# Patient Record
Sex: FEMALE | URBAN - METROPOLITAN AREA
[De-identification: names, ages, dates, MRNs, and addresses within clinical notes are randomized per-mention and may not be internally consistent; named-entity substitution may affect disease eponyms.]

---

## 2017-09-01 ENCOUNTER — OUTPATIENT (OUTPATIENT)
Dept: OUTPATIENT SERVICES | Facility: HOSPITAL | Age: 62
LOS: 1 days | End: 2017-09-01
Payer: MEDICAID

## 2017-09-06 DIAGNOSIS — R69 ILLNESS, UNSPECIFIED: ICD-10-CM

## 2017-10-01 PROCEDURE — G9001: CPT

## 2022-03-11 ENCOUNTER — INPATIENT (INPATIENT)
Facility: HOSPITAL | Age: 67
LOS: 6 days | Discharge: NURSING, REHAB OR LONG-TERM CARE | DRG: 598 | End: 2022-03-18
Attending: INTERNAL MEDICINE | Admitting: INTERNAL MEDICINE
Payer: MEDICARE

## 2022-03-11 VITALS
DIASTOLIC BLOOD PRESSURE: 65 MMHG | SYSTOLIC BLOOD PRESSURE: 93 MMHG | TEMPERATURE: 98 F | RESPIRATION RATE: 22 BRPM | WEIGHT: 139.99 LBS | HEART RATE: 104 BPM | HEIGHT: 62 IN

## 2022-03-11 DIAGNOSIS — K62.89 OTHER SPECIFIED DISEASES OF ANUS AND RECTUM: ICD-10-CM

## 2022-03-11 LAB
ALBUMIN SERPL ELPH-MCNC: 1.9 G/DL — LOW (ref 3.3–5)
ALP SERPL-CCNC: 131 U/L — HIGH (ref 30–120)
ALT FLD-CCNC: 16 U/L DA — SIGNIFICANT CHANGE UP (ref 10–60)
ANION GAP SERPL CALC-SCNC: 17 MMOL/L — SIGNIFICANT CHANGE UP (ref 5–17)
APTT BLD: 31.3 SEC — SIGNIFICANT CHANGE UP (ref 27.5–35.5)
AST SERPL-CCNC: 44 U/L — HIGH (ref 10–40)
BASOPHILS # BLD AUTO: 0.02 K/UL — SIGNIFICANT CHANGE UP (ref 0–0.2)
BASOPHILS NFR BLD AUTO: 0.1 % — SIGNIFICANT CHANGE UP (ref 0–2)
BILIRUB SERPL-MCNC: 0.4 MG/DL — SIGNIFICANT CHANGE UP (ref 0.2–1.2)
BUN SERPL-MCNC: 66 MG/DL — HIGH (ref 7–23)
CALCIUM SERPL-MCNC: 8.6 MG/DL — SIGNIFICANT CHANGE UP (ref 8.4–10.5)
CHLORIDE SERPL-SCNC: 97 MMOL/L — SIGNIFICANT CHANGE UP (ref 96–108)
CK SERPL-CCNC: 327 U/L — HIGH (ref 26–192)
CO2 SERPL-SCNC: 19 MMOL/L — LOW (ref 22–31)
CREAT SERPL-MCNC: 3.62 MG/DL — HIGH (ref 0.5–1.3)
EGFR: 13 ML/MIN/1.73M2 — LOW
EOSINOPHIL # BLD AUTO: 0 K/UL — SIGNIFICANT CHANGE UP (ref 0–0.5)
EOSINOPHIL NFR BLD AUTO: 0 % — SIGNIFICANT CHANGE UP (ref 0–6)
GLUCOSE SERPL-MCNC: 91 MG/DL — SIGNIFICANT CHANGE UP (ref 70–99)
HCT VFR BLD CALC: 38.8 % — SIGNIFICANT CHANGE UP (ref 34.5–45)
HGB BLD-MCNC: 12.5 G/DL — SIGNIFICANT CHANGE UP (ref 11.5–15.5)
IMM GRANULOCYTES NFR BLD AUTO: 0.4 % — SIGNIFICANT CHANGE UP (ref 0–1.5)
INR BLD: 1.19 RATIO — HIGH (ref 0.88–1.16)
LACTATE SERPL-SCNC: 1.9 MMOL/L — SIGNIFICANT CHANGE UP (ref 0.7–2)
LIDOCAIN IGE QN: 127 U/L — SIGNIFICANT CHANGE UP (ref 73–393)
LYMPHOCYTES # BLD AUTO: 1.45 K/UL — SIGNIFICANT CHANGE UP (ref 1–3.3)
LYMPHOCYTES # BLD AUTO: 9.2 % — LOW (ref 13–44)
MCHC RBC-ENTMCNC: 25.9 PG — LOW (ref 27–34)
MCHC RBC-ENTMCNC: 32.2 GM/DL — SIGNIFICANT CHANGE UP (ref 32–36)
MCV RBC AUTO: 80.5 FL — SIGNIFICANT CHANGE UP (ref 80–100)
MONOCYTES # BLD AUTO: 0.56 K/UL — SIGNIFICANT CHANGE UP (ref 0–0.9)
MONOCYTES NFR BLD AUTO: 3.6 % — SIGNIFICANT CHANGE UP (ref 2–14)
NEUTROPHILS # BLD AUTO: 13.58 K/UL — HIGH (ref 1.8–7.4)
NEUTROPHILS NFR BLD AUTO: 86.7 % — HIGH (ref 43–77)
NRBC # BLD: 0 /100 WBCS — SIGNIFICANT CHANGE UP (ref 0–0)
NT-PROBNP SERPL-SCNC: 2416 PG/ML — HIGH (ref 0–125)
PLATELET # BLD AUTO: 302 K/UL — SIGNIFICANT CHANGE UP (ref 150–400)
POTASSIUM SERPL-MCNC: 4.5 MMOL/L — SIGNIFICANT CHANGE UP (ref 3.5–5.3)
POTASSIUM SERPL-SCNC: 4.5 MMOL/L — SIGNIFICANT CHANGE UP (ref 3.5–5.3)
PROT SERPL-MCNC: 7 G/DL — SIGNIFICANT CHANGE UP (ref 6–8.3)
PROTHROM AB SERPL-ACNC: 13.7 SEC — HIGH (ref 10.5–13.4)
RAPID RVP RESULT: SIGNIFICANT CHANGE UP
RBC # BLD: 4.82 M/UL — SIGNIFICANT CHANGE UP (ref 3.8–5.2)
RBC # FLD: 19.9 % — HIGH (ref 10.3–14.5)
SARS-COV-2 RNA SPEC QL NAA+PROBE: SIGNIFICANT CHANGE UP
SODIUM SERPL-SCNC: 133 MMOL/L — LOW (ref 135–145)
TROPONIN I, HIGH SENSITIVITY RESULT: 22.8 NG/L — SIGNIFICANT CHANGE UP
WBC # BLD: 15.68 K/UL — HIGH (ref 3.8–10.5)
WBC # FLD AUTO: 15.68 K/UL — HIGH (ref 3.8–10.5)

## 2022-03-11 PROCEDURE — 99285 EMERGENCY DEPT VISIT HI MDM: CPT | Mod: FS

## 2022-03-11 PROCEDURE — 73562 X-RAY EXAM OF KNEE 3: CPT | Mod: 26,RT

## 2022-03-11 PROCEDURE — 70450 CT HEAD/BRAIN W/O DYE: CPT | Mod: 26,MA

## 2022-03-11 PROCEDURE — 72125 CT NECK SPINE W/O DYE: CPT | Mod: 26,MA

## 2022-03-11 PROCEDURE — 93010 ELECTROCARDIOGRAM REPORT: CPT

## 2022-03-11 PROCEDURE — 71045 X-RAY EXAM CHEST 1 VIEW: CPT | Mod: 26

## 2022-03-11 PROCEDURE — 74176 CT ABD & PELVIS W/O CONTRAST: CPT | Mod: 26,MA

## 2022-03-11 PROCEDURE — 71250 CT THORAX DX C-: CPT | Mod: 26,MA

## 2022-03-11 RX ORDER — PIPERACILLIN AND TAZOBACTAM 4; .5 G/20ML; G/20ML
3.38 INJECTION, POWDER, LYOPHILIZED, FOR SOLUTION INTRAVENOUS ONCE
Refills: 0 | Status: COMPLETED | OUTPATIENT
Start: 2022-03-11 | End: 2022-03-11

## 2022-03-11 RX ORDER — MORPHINE SULFATE 50 MG/1
2 CAPSULE, EXTENDED RELEASE ORAL EVERY 6 HOURS
Refills: 0 | Status: DISCONTINUED | OUTPATIENT
Start: 2022-03-11 | End: 2022-03-14

## 2022-03-11 RX ORDER — ACETAMINOPHEN 500 MG
1000 TABLET ORAL ONCE
Refills: 0 | Status: COMPLETED | OUTPATIENT
Start: 2022-03-11 | End: 2022-03-11

## 2022-03-11 RX ORDER — SODIUM CHLORIDE 9 MG/ML
1000 INJECTION INTRAMUSCULAR; INTRAVENOUS; SUBCUTANEOUS ONCE
Refills: 0 | Status: COMPLETED | OUTPATIENT
Start: 2022-03-11 | End: 2022-03-11

## 2022-03-11 RX ORDER — PANTOPRAZOLE SODIUM 20 MG/1
40 TABLET, DELAYED RELEASE ORAL DAILY
Refills: 0 | Status: DISCONTINUED | OUTPATIENT
Start: 2022-03-11 | End: 2022-03-16

## 2022-03-11 RX ORDER — ALBUTEROL 90 UG/1
2 AEROSOL, METERED ORAL EVERY 6 HOURS
Refills: 0 | Status: DISCONTINUED | OUTPATIENT
Start: 2022-03-11 | End: 2022-03-18

## 2022-03-11 RX ORDER — SODIUM CHLORIDE 9 MG/ML
1000 INJECTION, SOLUTION INTRAVENOUS
Refills: 0 | Status: DISCONTINUED | OUTPATIENT
Start: 2022-03-11 | End: 2022-03-12

## 2022-03-11 RX ADMIN — MORPHINE SULFATE 2 MILLIGRAM(S): 50 CAPSULE, EXTENDED RELEASE ORAL at 23:00

## 2022-03-11 RX ADMIN — SODIUM CHLORIDE 1000 MILLILITER(S): 9 INJECTION INTRAMUSCULAR; INTRAVENOUS; SUBCUTANEOUS at 11:39

## 2022-03-11 RX ADMIN — SODIUM CHLORIDE 1000 MILLILITER(S): 9 INJECTION INTRAMUSCULAR; INTRAVENOUS; SUBCUTANEOUS at 01:23

## 2022-03-11 RX ADMIN — PIPERACILLIN AND TAZOBACTAM 3.38 GRAM(S): 4; .5 INJECTION, POWDER, LYOPHILIZED, FOR SOLUTION INTRAVENOUS at 12:09

## 2022-03-11 RX ADMIN — MORPHINE SULFATE 2 MILLIGRAM(S): 50 CAPSULE, EXTENDED RELEASE ORAL at 22:35

## 2022-03-11 RX ADMIN — PIPERACILLIN AND TAZOBACTAM 200 GRAM(S): 4; .5 INJECTION, POWDER, LYOPHILIZED, FOR SOLUTION INTRAVENOUS at 11:39

## 2022-03-11 RX ADMIN — PANTOPRAZOLE SODIUM 40 MILLIGRAM(S): 20 TABLET, DELAYED RELEASE ORAL at 22:56

## 2022-03-11 RX ADMIN — SODIUM CHLORIDE 60 MILLILITER(S): 9 INJECTION, SOLUTION INTRAVENOUS at 22:36

## 2022-03-11 NOTE — ED ADULT NURSE NOTE - CHIEF COMPLAINT QUOTE
As per EMS " She is staying at the [facility] , we found her on the floor "  Pt is awake oriented x 3 and responsive verbally , Pt stated that she is legally blind. Pt states " I need antibiotic because I have infection , I have diarrhea for months now " Pt reported that she fell last night and complians of abdominal pain , right hand and legs

## 2022-03-11 NOTE — ED PROVIDER NOTE - PHYSICAL EXAMINATION
Constitutional: Awake, Alert, non-toxic. NAD. Well appearing, well nourished.   HEAD: Normocephalic, atraumatic.   EYES: (+) Hazy cornea EOM intact, conjunctiva and sclera are clear bilaterally. No raccoon eyes.   ENT: No rhinorrhea, normal pharynx, patent, no tonsillar exudate or enlargement, mucous membranes pink/moist, no erythema, no drooling or stridor.   NECK: Supple, non-tender  BACK: No midline or paraspinal TTP of cervical/thoracic/lumbar spine, FROM. No ecchymosis or hematomas.   CARDIOVASCULAR: Normal S1, S2; regular rate and rhythm.  RESPIRATORY: Normal respiratory effort; breath sounds CTAB, no wheezes, rhonchi, or rales. Speaking in full sentences. No accessory muscle use.   ABDOMEN: Soft; (+) distended, (+) generalized minor TTP, no guarding or rebound TTP. No CVAT   EXTREMITIES: Full passive and active ROM in all extremities; (+) right knee pain, hips and ankles non-tender to palpation; distal pulses palpable and symmetric, no edema, no crepitus or step off  SKIN: Warm, dry; good skin turgor, no apparent lesions or rashes, no ecchymosis, brisk capillary refill.  NEURO: A&O x3. Sensory and motor functions are grossly intact. Speech is normal. Appearance and judgement seem appropriate for gender and age. No neurological deficits. Neurovascular sensation intact motor function 5/5 of upper and lower extremities, CN II-XII grossly intact, no ataxia, absent pronator drift, intact cerebellar function. Speech clear, without articulation or word-finding difficulties. Eyes- PERRL bilaterally. EOMs in tact. No nystagmus. No facial droop. Constitutional: Awake, Alert, non-toxic  HEAD: Normocephalic, atraumatic.   EYES: (+) Hazy cornea EOM intact, conjunctiva and sclera are clear bilaterally. No raccoon eyes.   ENT: No rhinorrhea, normal pharynx, patent, no tonsillar exudate or enlargement, mucous membranes pink/moist, no erythema, no drooling or stridor.   NECK: Supple, non-tender  BACK: No midline or paraspinal TTP of cervical/thoracic/lumbar spine, FROM. No ecchymosis or hematomas.   CARDIOVASCULAR: Normal S1, S2; regular rate and rhythm.  RESPIRATORY: Normal respiratory effort; breath sounds CTAB, no wheezes, rhonchi, or rales. Speaking in full sentences. No accessory muscle use.   ABDOMEN: Soft; (+) distended, (+) generalized minor TTP, no guarding or rebound TTP. No CVAT   EXTREMITIES: Full passive and active ROM in all extremities; (+) right knee pain, hips and ankles non-tender to palpation; distal pulses palpable and symmetric, no edema, no crepitus or step off  SKIN: Warm, dry; good skin turgor, no apparent lesions or rashes, no ecchymosis, brisk capillary refill.  NEURO: A&O x3. Sensory and motor functions are grossly intact. Speech is normal. Neurovascular sensation intact motor function 5/5 of upper and lower extremities, CN II-XII grossly intact, no ataxia, absent pronator drift, intact cerebellar function. Speech clear, without articulation or word-finding difficulties. Eyes- PERRL bilaterally. EOMs in tact. No nystagmus. No facial droop.

## 2022-03-11 NOTE — ED PROVIDER NOTE - OBJECTIVE STATEMENT
66 y/o female with 66 y/o female without reported PMHx BIBA from [facility] due to EMS. As per EMS, patient found on the floor. pt reports she has been having diarrhea, vomiting, and abdominal pain for months. pt reports she fell due to weakness. Reports she came from [place] and needs placement in to nursing home. pt reports she has not seen a doctor in 2 years and not currently taking meds. pt requesting abx due to an abdominal infection. patient sister Katarina ([phone]) reports patient was staying with her brother at the [facility]. Reports she was at a NH in [place] in which she was transitioning to a NH in [place]. As per sister, patient has a hx of Schizophrenia and recently diagnosed with breast CA with mets to bone. pt sister reports patient is in denial and refused to acknowledge diagnosis in which she has not had further work up or treatment. pt denies headache, dysuria, hematuria, cp, sob, or any other complaints.

## 2022-03-11 NOTE — ED PROVIDER NOTE - CARE PLAN
1 Principal Discharge DX:	Proctitis  Secondary Diagnosis:	Ascites  Secondary Diagnosis:	Breast CA  Secondary Diagnosis:	Pleural effusion  Secondary Diagnosis:	KEATON (acute kidney injury)

## 2022-03-11 NOTE — ED ADULT NURSE REASSESSMENT NOTE - NSIMPLEMENTINTERV_GEN_ALL_ED
Implemented All Fall with Harm Risk Interventions:  [place] to call system. Call bell, personal items and telephone within reach. Instruct patient to call for assistance. Room bathroom lighting operational. Non-slip footwear when patient is off stretcher. Physically safe environment: no spills, clutter or unnecessary equipment. Stretcher in lowest position, wheels locked, appropriate side rails in place. Provide visual cue, wrist band, yellow gown, etc. Monitor gait and stability. Monitor for mental status changes and reorient to person, place, and time. Review medications for side effects contributing to fall risk. Reinforce activity limits and safety measures with patient and family. Provide visual clues: red socks.

## 2022-03-11 NOTE — CONSULT NOTE ADULT - ASSESSMENT
66 y/o female without reported PMHx BIBA from [facility] due to EMS. As per EMS, patient found on the floor. pt reports she has been having diarrhea, vomiting, and abdominal pain for months. pt reports she fell due to weakness. Reports she came from [place] and needs placement in to nursing home. pt reports she has not seen a doctor in 2 years and not currently taking meds    ex smoker  emphysema  Mets Ca  Liver mets likely  Ascites - Pleural Eff  Breast Ca -   Pulm Nodules  AAA  KEATON      may need paracentesis  CT chest and abd reviewed - c/w mets ca - and atherosclerotic heart disease  pain assessment  goals of care discussion  dvt p  assist with needs - ADL  proventil PRN for sob and or wheezing  spoke with sister - pt needs - assistance with placement - discussion about Hospice  prognosis poor  labs and imaging reviewed with the sister and patient  gentle IVF for KEATON -

## 2022-03-11 NOTE — H&P ADULT - ASSESSMENT
# Metastatic Breast cancer  Pleural Effusion likely Malignant Effusion from metastatic disease   Ascites     Patient refused treatment for Breast ca as per family   Will get Palliative care consult for GOC   Pulm consult ??Thoracentesis   Nebs     # KEATON on CKD Bladder sono   Renal consult   Gentle hydration   Monitor renal function

## 2022-03-11 NOTE — PATIENT PROFILE ADULT - FALL HARM RISK - HARM RISK INTERVENTIONS

## 2022-03-11 NOTE — ED PROVIDER NOTE - ATTENDING CONTRIBUTION TO CARE
68 y/o female without reported PMHx BIBA from [facility] due to EMS. As per EMS, patient found on the floor. pt reports she has been having diarrhea, vomiting, and abdominal pain for months. pt reports she fell due to weakness. Reports she came from [place] and needs placement in to nursing home. pt reports she has not seen a doctor in 2 years and not currently taking meds. pt requesting abx due to an abdominal infection. patient sister Katarina ([phone]) reports patient was staying with her brother at the [facility]". Reports she was at a NH in [place] in which she was transitioning to a NH in [place]. As per sister, patient has a hx of Schizophrenia and recently diagnosed with breast CA with mets to bone. pt sister reports patient is in denial and refused to acknowledge diagnosis in which she has not had further work up or treatment. pt denies headache, dysuria, hematuria, cp, sob, or any other complaints.    VSS Afebrile, NAD  HEENT - clear  PERRL EOMI  Neck supple  lungs clear  Cor S1S2 RR - MGR  Abd soft nontender, distended, ??Ascites,  no mass or HSM, no rebound  Ext FROM intact, no edema  Neuro Intact, no deficits.  Skin Warm and dry no rash.    Imp- Diarrhea and vomiting.  Plan - labs, CT abd. IVF. May need admission for further eval and treatment.    I performed a history and physical exam of the patient and discussed their management with the advanced care provider. I reviewed the advanced care provider's note and agree with the documented findings and plan of care. My medical decision making and objective findings are found above.

## 2022-03-11 NOTE — CONSULT NOTE ADULT - SUBJECTIVE AND OBJECTIVE BOX
Date/Time Patient Seen:  		  Referring MD:   Data Reviewed	       Patient is a 67y old  Female who presents with a chief complaint of     Subjective/HPI  in bed  seen and examined  vs noted  labs reviewed  ct chest and abd reviewed  spoke with sister  ex smoker  has no children   single  retired    HISTORY OF PRESENT ILLNESS:    International Travel:  International Travel within 21 days? No.(1)     Domestic Travel:  Any travel outside of [facility] within the last 14 days? No.(1)     Preferred Language to Address Healthcare:  · Preferred Language to Address Healthcare	English     Child Abuse Assessment (patients less than 13 yrs):  Chief Complaint: fall and diarrhea.    · Chief Complaint: The patient is a 67y Female complaining of fall.  · HPI Objective Statement: 66 y/o female without reported PMHx BIBA from [facility] due to EMS. As per EMS, patient found on the floor. pt reports she has been having diarrhea, vomiting, and abdominal pain for months. pt reports she fell due to weakness. Reports she came from [place] and needs placement in to nursing home. pt reports she has not seen a doctor in 2 years and not currently taking meds. pt requesting abx due to an abdominal infection. patient sister Katarina ([phone]) reports patient was staying with her brother at the [facility]. Reports she was at a NH in [place] in which she was transitioning to a NH in [place]. As per sister, patient has a hx of Schizophrenia and recently diagnosed with breast CA with mets to bone. pt sister reports patient is in denial and refused to acknowledge diagnosis in which she has not had further work up or treatment. pt denies headache, dysuria, hematuria, cp, sob, or any other complaints.    HIV:    HIV Test Questions:  · In accordance with [place] State law, we offer every patient who comes to our ED an HIV test. Would you like to be tested today?	Opt out    PAST MEDICAL/SURGICAL/FAMILY/SOCIAL HISTORY:    Tobacco Usage:  · Tobacco Usage	Former smoker    ALLERGIES AND HOME MEDICATIONS:   Allergies:        Allergies:  	No Known Allergies:     Home Medications:   * Outpatient Medication Status not yet specified     PAST MEDICAL & SURGICAL HISTORY:        Medication list         MEDICATIONS  (STANDING):  acetaminophen   IVPB .. 1000 milliGRAM(s) IV Intermittent once    MEDICATIONS  (PRN):         Vitals log        ICU Vital Signs Last 24 Hrs  T(C): 36.6 (11 Mar 2022 11:25), Max: 36.6 (11 Mar 2022 11:25)  T(F): 97.8 (11 Mar 2022 11:25), Max: 97.8 (11 Mar 2022 11:25)  HR: 98 (11 Mar 2022 11:25) (98 - 105)  BP: 105/71 (11 Mar 2022 11:25) (93/65 - 105/71)  BP(mean): --  ABP: --  ABP(mean): --  RR: 20 (11 Mar 2022 11:25) (16 - 22)  SpO2: 99% (11 Mar 2022 11:25) (96% - 99%)           Input and Output:  I&O's Detail      Lab Data                        12.5   15.68 )-----------( 302      ( 11 Mar 2022 11:14 )             38.8     03-11    133<L>  |  97  |  66<H>  ----------------------------<  91  4.5   |  19<L>  |  3.62<H>    Ca    8.6      11 Mar 2022 11:14    TPro  7.0  /  Alb  1.9<L>  /  TBili  0.4  /  DBili  x   /  AST  44<H>  /  ALT  16  /  AlkPhos  131<H>  03-11      CARDIAC MARKERS ( 11 Mar 2022 11:14 )  x     / x     / 327 U/L / x     / x            Review of Systems	  poor historian      Objective     Physical Examination    heart s1s2  lung dec BS  abd soft  head nc  verbal      Pertinent Lab findings & Imaging      Gallardo:  NO   Adequate UO     I&O's Detail           Discussed with:     Cultures:	        Radiology    ACC: [id number] EXAM:  CT ABDOMEN AND PELVIS                        ACC: [id number] EXAM:  CT CHEST                          PROCEDURE DATE:  03/11/2022          INTERPRETATION:  CLINICAL INFORMATION: Fall, abdominal pain.    COMPARISON: None.    CONTRAST/COMPLICATIONS:  IV Contrast: NONE  Oral Contrast: NONE  Complications: None reported at time of study completion    PROCEDURE:  CT of the Chest, Abdomen and Pelvis was performed.  Sagittal and coronal reformats were performed.    FINDINGS:    CHEST:  LUNGS AND LARGE AIRWAYS: Central airways are patent. There is emphysema   as well as scattered biapical and dependent lung scarring. Compressive   lower lobe atelectasis secondary to small pleural effusions.   Indeterminate 5 mm right upper lobe nodule, image 78 series 3. Also,   indeterminate 4 mm left upper lobe subpleural nodule, image 54 series 3.  PLEURA: Small pleural effusions. No pneumothorax.  VESSELS: Normal caliber thoracic aorta with atheromatous change. Main   pulmonary artery size is mildly enlarged measuring 3.3 cm. correlate for   pulmonary hypertension.  HEART: Heart size is within normal limits. Aortic valve and coronary   calcification. Trace pericardial fluid.  MEDIASTINUM AND MARY: Small volume nodes of the thorax. Patulous   esophagus.  CHEST WALL AND LOWER NECK: Right breast mass measures 2.2 cm, image 89   series 3. This may be consistent with history of breast neoplasm as per   EMR. Nonspecific nodular appearance of the nipples. Correlate with prior   mammogram history.    ABDOMEN AND PELVIS:  LIVER: Lobulated liver contour. Numerous hepatic hypodensities are not   well evaluated in the absence of IV contrast. Few of these hepatic   hypodensities may reflect cysts, for example along the inferior right   hepatic lobe measuring 2.9 cm and along the anterior left lobe measuring   1.1 cm. Other hypodensities are indeterminate. Metastatic disease cannot   be excluded.  BILE DUCTS: Prominent CBD measuring up to 8 mm.  GALLBLADDER: Cholelithiasis.  SPLEEN: Normal size.  PANCREAS: No main ductal dilatation  ADRENALS: Thickened adrenal glands suboptimally assessed.  KIDNEYS/URETERS: Mild left collecting system fullness. No right   hydronephrosis.    BLADDER: Underdistended.  REPRODUCTIVE ORGANS: Suboptimally assessed given absence of IV contrast.    BOWEL: Underdistended stomach and small bowel. There is rectal wall   thickening with surrounding inflammation concerning for proctitis.   Colonic diverticulosis without diverticulitis. Appendix is not clearly   visualized. Correlate with postcontrast imaging for better   characterization.  PERITONEUM: Large ascites. Apparent right pelvic dependent soft tissue   thickening, image 233 series 3. Recommend correlation with postcontrast   imaging to evaluate for peritoneal disease.  VESSELS: Aortoiliac atheromatous changes. 3.5 cm abdominal aortic   aneurysm.  RETROPERITONEUM/LYMPH NODES: Suboptimally assessed.  ABDOMINAL WALL: Unremarkable  BONES: Diffuse osseous heterogeneity suggestive of metastatic disease.    IMPRESSION:    Limited noncontrast study.    CHEST:  1. Right breast mass measures 2.2 cm concerning for neoplasm.  2. Small pleural effusions. Emphysema.  3. Indeterminate 5 mm right upper lobe nodule.  4. Aortic valve and coronary calcification.    ABDOMEN/PELVIS:  1. Proctitis.  2. Large ascites. Apparent right pelvic dependent soft tissue thickening,   image 233 series 3. Recommend correlation with postcontrast imaging to   exclude peritoneal disease.  3. Lobulated liver contour. Numerous hepatic hypodensities several which   are indeterminate. Metastatic disease cannot be excluded.  4. Diffuse osseous heterogeneity suggestive of metastatic disease.  5. There is a 3.5 cm abdominal aortic aneurysm.    --- End of Report ---            ZE VIEIRA M.D., ATTENDING RADIOLOGIST  This document has been electronically signed. Mar 11 2022  2:20PM

## 2022-03-11 NOTE — ED ADULT NURSE NOTE - NS ED NOTE  TALK SOMEONE YN
I reviewed the H&P, I examined the patient, and there are no changes in the patient's condition.    
No

## 2022-03-11 NOTE — ED ADULT TRIAGE NOTE - CHIEF COMPLAINT QUOTE
As per EMS " She is staying at the [facility] , we found her on the floor "  Pt is awake and responsive verbally , Pt stated that she is legally blind. Pt states " I need antibiotic because I have infection , I have diarrhea for months now " As per EMS " She is staying at the [facility] , we found her on the floor "  Pt is awake oriented x 3 and responsive verbally , Pt stated that she is legally blind. Pt states " I need antibiotic because I have infection , I have diarrhea for months now " Pt reported that she fell last night and complians of abdominal pain , right hand and legs

## 2022-03-11 NOTE — ED ADULT NURSE NOTE - OBJECTIVE STATEMENT
Pt BIBA from [facility] , pt reported found on the floor by the EMS . Pt stated that she fell earlier. Pt also stated that she needs antibiotic for her diarrhea . Pt reported that she have diarrhea for months now. Pt also complains of pain right arm and wrist and bilateral legs

## 2022-03-12 LAB
ANION GAP SERPL CALC-SCNC: 14 MMOL/L — SIGNIFICANT CHANGE UP (ref 5–17)
ANION GAP SERPL CALC-SCNC: 17 MMOL/L — SIGNIFICANT CHANGE UP (ref 5–17)
BASOPHILS # BLD AUTO: 0.01 K/UL — SIGNIFICANT CHANGE UP (ref 0–0.2)
BASOPHILS NFR BLD AUTO: 0.1 % — SIGNIFICANT CHANGE UP (ref 0–2)
BUN SERPL-MCNC: 79 MG/DL — HIGH (ref 7–23)
BUN SERPL-MCNC: 81 MG/DL — HIGH (ref 7–23)
CALCIUM SERPL-MCNC: 7.6 MG/DL — LOW (ref 8.4–10.5)
CALCIUM SERPL-MCNC: 7.8 MG/DL — LOW (ref 8.4–10.5)
CEA SERPL-MCNC: 103 NG/ML — HIGH (ref 0–3.8)
CHLORIDE SERPL-SCNC: 98 MMOL/L — SIGNIFICANT CHANGE UP (ref 96–108)
CHLORIDE SERPL-SCNC: 99 MMOL/L — SIGNIFICANT CHANGE UP (ref 96–108)
CO2 SERPL-SCNC: 20 MMOL/L — LOW (ref 22–31)
CO2 SERPL-SCNC: 22 MMOL/L — SIGNIFICANT CHANGE UP (ref 22–31)
CREAT SERPL-MCNC: 3.72 MG/DL — HIGH (ref 0.5–1.3)
CREAT SERPL-MCNC: 3.74 MG/DL — HIGH (ref 0.5–1.3)
CULTURE RESULTS: SIGNIFICANT CHANGE UP
EGFR: 13 ML/MIN/1.73M2 — LOW
EGFR: 13 ML/MIN/1.73M2 — LOW
EOSINOPHIL # BLD AUTO: 0.03 K/UL — SIGNIFICANT CHANGE UP (ref 0–0.5)
EOSINOPHIL NFR BLD AUTO: 0.3 % — SIGNIFICANT CHANGE UP (ref 0–6)
GLUCOSE SERPL-MCNC: 101 MG/DL — HIGH (ref 70–99)
GLUCOSE SERPL-MCNC: 86 MG/DL — SIGNIFICANT CHANGE UP (ref 70–99)
HCT VFR BLD CALC: 32 % — LOW (ref 34.5–45)
HCV AB S/CO SERPL IA: 0.19 S/CO — SIGNIFICANT CHANGE UP (ref 0–0.99)
HCV AB SERPL-IMP: SIGNIFICANT CHANGE UP
HGB BLD-MCNC: 10.3 G/DL — LOW (ref 11.5–15.5)
IMM GRANULOCYTES NFR BLD AUTO: 0.4 % — SIGNIFICANT CHANGE UP (ref 0–1.5)
LDH SERPL L TO P-CCNC: 350 U/L — HIGH (ref 50–242)
LYMPHOCYTES # BLD AUTO: 1.87 K/UL — SIGNIFICANT CHANGE UP (ref 1–3.3)
LYMPHOCYTES # BLD AUTO: 18.9 % — SIGNIFICANT CHANGE UP (ref 13–44)
MCHC RBC-ENTMCNC: 26 PG — LOW (ref 27–34)
MCHC RBC-ENTMCNC: 32.2 GM/DL — SIGNIFICANT CHANGE UP (ref 32–36)
MCV RBC AUTO: 80.8 FL — SIGNIFICANT CHANGE UP (ref 80–100)
MONOCYTES # BLD AUTO: 0.43 K/UL — SIGNIFICANT CHANGE UP (ref 0–0.9)
MONOCYTES NFR BLD AUTO: 4.4 % — SIGNIFICANT CHANGE UP (ref 2–14)
NEUTROPHILS # BLD AUTO: 7.49 K/UL — HIGH (ref 1.8–7.4)
NEUTROPHILS NFR BLD AUTO: 75.9 % — SIGNIFICANT CHANGE UP (ref 43–77)
NRBC # BLD: 0 /100 WBCS — SIGNIFICANT CHANGE UP (ref 0–0)
PLATELET # BLD AUTO: 278 K/UL — SIGNIFICANT CHANGE UP (ref 150–400)
POTASSIUM SERPL-MCNC: 4.2 MMOL/L — SIGNIFICANT CHANGE UP (ref 3.5–5.3)
POTASSIUM SERPL-MCNC: 4.9 MMOL/L — SIGNIFICANT CHANGE UP (ref 3.5–5.3)
POTASSIUM SERPL-SCNC: 4.2 MMOL/L — SIGNIFICANT CHANGE UP (ref 3.5–5.3)
POTASSIUM SERPL-SCNC: 4.9 MMOL/L — SIGNIFICANT CHANGE UP (ref 3.5–5.3)
RBC # BLD: 3.96 M/UL — SIGNIFICANT CHANGE UP (ref 3.8–5.2)
RBC # FLD: 19.9 % — HIGH (ref 10.3–14.5)
SODIUM SERPL-SCNC: 135 MMOL/L — SIGNIFICANT CHANGE UP (ref 135–145)
SODIUM SERPL-SCNC: 135 MMOL/L — SIGNIFICANT CHANGE UP (ref 135–145)
SPECIMEN SOURCE: SIGNIFICANT CHANGE UP
TSH SERPL-MCNC: 4.22 UIU/ML — HIGH (ref 0.27–4.2)
WBC # BLD: 9.87 K/UL — SIGNIFICANT CHANGE UP (ref 3.8–10.5)
WBC # FLD AUTO: 9.87 K/UL — SIGNIFICANT CHANGE UP (ref 3.8–10.5)

## 2022-03-12 RX ORDER — SODIUM CHLORIDE 9 MG/ML
1000 INJECTION INTRAMUSCULAR; INTRAVENOUS; SUBCUTANEOUS
Refills: 0 | Status: DISCONTINUED | OUTPATIENT
Start: 2022-03-12 | End: 2022-03-16

## 2022-03-12 RX ADMIN — SODIUM CHLORIDE 60 MILLILITER(S): 9 INJECTION, SOLUTION INTRAVENOUS at 07:22

## 2022-03-12 NOTE — PROGRESS NOTE ADULT - ASSESSMENT
68 y/o female without reported PMHx BIBA from [facility] due to EMS. As per EMS, patient found on the floor. pt reports she has been having diarrhea, vomiting, and abdominal pain for months. pt reports she fell due to weakness. Reports she came from [place] and needs placement in to nursing home. pt reports she has not seen a doctor in 2 years and not currently taking meds    ex smoker  emphysema  Mets Ca  Liver mets likely  Ascites - Pleural Eff  Breast Ca -   Pulm Nodules  AAA  KEATON      may need paracentesis  CT chest and abd reviewed - c/w mets ca - and atherosclerotic heart disease  pain assessment  goals of care discussion  dvt p  assist with needs - ADL  proventil PRN for sob and or wheezing  spoke with sister - pt needs - assistance with placement - discussion about Hospice  prognosis poor  labs and imaging reviewed with the sister and patient  gentle IVF for KEATON -

## 2022-03-12 NOTE — PROGRESS NOTE ADULT - SUBJECTIVE AND OBJECTIVE BOX
Date/Time Patient Seen:  		  Referring MD:   Data Reviewed	       Patient is a 67y old  Female who presents with a chief complaint of     Subjective/HPI     PAST MEDICAL & SURGICAL HISTORY:  Breast cancer          Medication list         MEDICATIONS  (STANDING):  lactated ringers. 1000 milliLiter(s) (60 mL/Hr) IV Continuous <Continuous>  pantoprazole  Injectable 40 milliGRAM(s) IV Push daily    MEDICATIONS  (PRN):  ALBUTerol    90 MICROgram(s) HFA Inhaler 2 Puff(s) Inhalation every 6 hours PRN Shortness of Breath and/or Wheezing  morphine  - Injectable 2 milliGRAM(s) IV Push every 6 hours PRN Severe Pain (7 - 10)         Vitals log        ICU Vital Signs Last 24 Hrs  T(C): 36.3 (12 Mar 2022 05:22), Max: 36.7 (12 Mar 2022 02:05)  T(F): 97.3 (12 Mar 2022 05:22), Max: 98 (12 Mar 2022 02:05)  HR: 100 (12 Mar 2022 05:22) (90 - 109)  BP: 122/79 (12 Mar 2022 05:22) (79/48 - 136/85)  BP(mean): --  ABP: --  ABP(mean): --  RR: 17 (12 Mar 2022 05:22) (16 - 22)  SpO2: 96% (12 Mar 2022 05:22) (96% - 99%)           Input and Output:  I&O's Detail    11 Mar 2022 07:01  -  12 Mar 2022 07:00  --------------------------------------------------------  IN:    Lactated Ringers: 540 mL  Total IN: 540 mL    OUT:  Total OUT: 0 mL    Total NET: 540 mL          Lab Data                        12.5   15.68 )-----------( 302      ( 11 Mar 2022 11:14 )             38.8     03-11    133<L>  |  97  |  66<H>  ----------------------------<  91  4.5   |  19<L>  |  3.62<H>    Ca    8.6      11 Mar 2022 11:14    TPro  7.0  /  Alb  1.9<L>  /  TBili  0.4  /  DBili  x   /  AST  44<H>  /  ALT  16  /  AlkPhos  131<H>  03-11      CARDIAC MARKERS ( 11 Mar 2022 11:14 )  x     / x     / 327 U/L / x     / x            Review of Systems	      Objective     Physical Examination    heart s1s2  lung dec BS  abd soft      Pertinent Lab findings & Imaging      Paulina:  NO   Adequate UO     I&O's Detail    11 Mar 2022 07:01  -  12 Mar 2022 07:00  --------------------------------------------------------  IN:    Lactated Ringers: 540 mL  Total IN: 540 mL    OUT:  Total OUT: 0 mL    Total NET: 540 mL               Discussed with:     Cultures:	        Radiology

## 2022-03-12 NOTE — PROGRESS NOTE ADULT - SUBJECTIVE AND OBJECTIVE BOX
Patient is a 67y old  Female who presents with a chief complaint of     SUBJECTIVE / OVERNIGHT EVENTS: no events over night     MEDICATIONS  (STANDING):  pantoprazole  Injectable 40 milliGRAM(s) IV Push daily  sodium chloride 0.9%. 1000 milliLiter(s) (50 mL/Hr) IV Continuous <Continuous>    MEDICATIONS  (PRN):  ALBUTerol    90 MICROgram(s) HFA Inhaler 2 Puff(s) Inhalation every 6 hours PRN Shortness of Breath and/or Wheezing  morphine  - Injectable 2 milliGRAM(s) IV Push every 6 hours PRN Severe Pain (7 - 10)      CAPILLARY BLOOD GLUCOSE        I&O's Summary    11 Mar 2022 07:01  -  12 Mar 2022 07:00  --------------------------------------------------------  IN: 540 mL / OUT: 0 mL / NET: 540 mL      T(C): 36.5 (03-12-22 @ 17:47), Max: 36.8 (03-12-22 @ 14:01)  HR: 103 (03-12-22 @ 17:47) (102 - 104)  BP: 103/65 (03-12-22 @ 17:47) (103/65 - 121/73)  RR: 17 (03-12-22 @ 17:47) (17 - 17)  SpO2: 96% (03-12-22 @ 17:47) (96% - 97%)    PHYSICAL EXAM:  GENERAL: NAD, well-developed  HEAD:  Atraumatic, Normocephalic  EYES: EOMI, PERRLA, conjunctiva and sclera clear  NECK: Supple, No JVD  CHEST/LUNG: Clear to auscultation bilaterally; No wheeze  HEART: Regular rate and rhythm; No murmurs, rubs, or gallops  ABDOMEN: Soft, Nontender, Nondistended; Bowel sounds present  EXTREMITIES:  2+ Peripheral Pulses, No clubbing, cyanosis, or edema  PSYCH: AAOx3  NEUROLOGY: non-focal  SKIN: No rashes or lesions    LABS:                        10.3   9.87  )-----------( 278      ( 12 Mar 2022 16:53 )             32.0     03-12    135  |  99  |  79<H>  ----------------------------<  101<H>  4.2   |  22  |  3.74<H>    Ca    7.6<L>      12 Mar 2022 16:53    TPro  7.0  /  Alb  1.9<L>  /  TBili  0.4  /  DBili  x   /  AST  44<H>  /  ALT  16  /  AlkPhos  131<H>  03-11    PT/INR - ( 11 Mar 2022 11:14 )   PT: 13.7 sec;   INR: 1.19 ratio         PTT - ( 11 Mar 2022 11:14 )  PTT:31.3 sec  CARDIAC MARKERS ( 11 Mar 2022 11:14 )  x     / x     / 327 U/L / x     / x              RADIOLOGY & ADDITIONAL TESTS:    Imaging Personally Reviewed:    Consultant(s) Notes Reviewed:      Care Discussed with Consultants/Other Providers:

## 2022-03-12 NOTE — CONSULT NOTE ADULT - SUBJECTIVE AND OBJECTIVE BOX
NEPHROLOGY CONSULTATION    CHIEF COMPLAINT: fall    HPI:  Pt is 66 yo female without reported PMH BIBA from [facility] by EMS. As per EMS, patient found on the floor, pt reports she has been having diarrhea, vomiting, and abdominal pain for months. Pt reports she fell due to weakness. Reports she came from [place] and needs placement into nursing home. Pt reports she has not seen a doctor in 2 years and not currently taking any meds. As per sister, patient has a hx of Schizophrenia and recently diagnosed with breast CA with mets to bone. No headache, dysuria, hematuria, cp, sob, or any other complaints. Noted to have KEATON.    ROS:  as above    Allergies:  No Known Allergies    PAST MEDICAL & SURGICAL HISTORY:  Breast cancer    SOCIAL HISTORY:  negative    FAMILY HISTORY:  NC    MEDICATIONS  (STANDING):  lactated ringers. 1000 milliLiter(s) (60 mL/Hr) IV Continuous <Continuous>  pantoprazole  Injectable 40 milliGRAM(s) IV Push daily    Vital Signs Last 24 Hrs  T(C): 36.8 (03-12-22 @ 14:01), Max: 36.8 (03-12-22 @ 14:01)  T(F): 98.3 (03-12-22 @ 14:01), Max: 98.3 (03-12-22 @ 14:01)  HR: 104 (03-12-22 @ 14:01) (90 - 109)  BP: 121/73 (03-12-22 @ 14:01) (79/48 - 136/85)  RR: 17 (03-12-22 @ 14:01) (17 - 20)  SpO2: 97% (03-12-22 @ 14:01) (96% - 98%)    LABS:                        12.5   15.68 )-----------( 302      ( 11 Mar 2022 11:14 )             38.8     03-12    135  |  98  |  81<H>  ----------------------------<  86  4.9   |  20<L>  |  3.72<H>    Ca    7.8<L>      12 Mar 2022 11:24    TPro  7.0  /  Alb  1.9<L>  /  TBili  0.4  /  DBili  x   /  AST  44<H>  /  ALT  16  /  AlkPhos  131<H>  03-11    LIVER FUNCTIONS - ( 11 Mar 2022 11:14 )  Alb: 1.9 g/dL / Pro: 7.0 g/dL / ALK PHOS: 131 U/L / ALT: 16 U/L DA / AST: 44 U/L / GGT: x           PT/INR - ( 11 Mar 2022 11:14 )   PT: 13.7 sec;   INR: 1.19 ratio      PTT - ( 11 Mar 2022 11:14 )  PTT:31.3 sec    A/P:    full consult to follow    [phone]       NEPHROLOGY CONSULTATION    CHIEF COMPLAINT: fall    HPI:  Pt is 68 yo female without reported PMH BIBA from [facility]. As per EMS, patient found on the floor, pt has been having diarrhea, vomiting, and abdominal pain. Fell due to weakness. Reports she came from [place] and needs placement into nursing home. Pt reports she has not seen a doctor in 2 years and not currently taking any meds. As per sister, patient has a hx of Schizophrenia and recently diagnosed with breast CA with mets to bone. No headache, dysuria, hematuria, cp, sob. Noted to have KEATON.    ROS:  as above    Allergies:  No Known Allergies    PAST MEDICAL & SURGICAL HISTORY:  Breast cancer    SOCIAL HISTORY:  negative    FAMILY HISTORY:  NC    MEDICATIONS  (STANDING):  lactated ringers. 1000 milliLiter(s) (60 mL/Hr) IV Continuous <Continuous>  pantoprazole  Injectable 40 milliGRAM(s) IV Push daily    Vital Signs Last 24 Hrs  T(C): 36.8 (03-12-22 @ 14:01), Max: 36.8 (03-12-22 @ 14:01)  T(F): 98.3 (03-12-22 @ 14:01), Max: 98.3 (03-12-22 @ 14:01)  HR: 104 (03-12-22 @ 14:01) (90 - 109)  BP: 121/73 (03-12-22 @ 14:01) (79/48 - 136/85)  RR: 17 (03-12-22 @ 14:01) (17 - 20)  SpO2: 97% (03-12-22 @ 14:01) (96% - 98%)    s1s2  b/l air entry  soft, distended  tr edema    LABS:                        12.5   15.68 )-----------( 302      ( 11 Mar 2022 11:14 )             38.8     03-12    135  |  98  |  81<H>  ----------------------------<  86  4.9   |  20<L>  |  3.72<H>    Ca    7.8<L>      12 Mar 2022 11:24    TPro  7.0  /  Alb  1.9<L>  /  TBili  0.4  /  DBili  x   /  AST  44<H>  /  ALT  16  /  AlkPhos  131<H>  03-11    LIVER FUNCTIONS - ( 11 Mar 2022 11:14 )  Alb: 1.9 g/dL / Pro: 7.0 g/dL / ALK PHOS: 131 U/L / ALT: 16 U/L DA / AST: 44 U/L / GGT: x           PT/INR - ( 11 Mar 2022 11:14 )   PT: 13.7 sec;   INR: 1.19 ratio      PTT - ( 11 Mar 2022 11:14 )  PTT:31.3 sec    A/P:    Appears to have breast ca metastatic to liver, bone, possibly lungs  Ascites  KEATON in setting of above although baseline renal fx is unknown  NS at 50cc/hr  Overall options are limited and prognosis is poor  Consider heme/onc eval  Palliative care eval is most appropriate  Will f/u [facility]    [phone]

## 2022-03-12 NOTE — PROGRESS NOTE ADULT - ASSESSMENT
# Metastatic Breast cancer  Pleural Effusion likely Malignant Effusion from metastatic disease   Ascites     Patient refused treatment for Breast ca as per family   Will get Palliative care consult for GOC   Pulm consult ??Thoracentesis   Nebs     # KEATON on CKD Bladder sono   Renal consult appreciated   Gentle hydration   Monitor renal function     spoke with sister this morning   Psych consult to assess capacity to take medical decisions   - prognosis poor

## 2022-03-13 NOTE — DIETITIAN INITIAL EVALUATION ADULT. - OTHER INFO
Per H&P, pt is a "66 yo F BIBA from [facility], pt reported found on the floor by the EMS. Pt stated that she fell earlier. Pt also stated that she needs antibiotic for her diarrhea. Pt reported that she have diarrhea for months now. Pt also complains of pain right arm and wrist and bilateral legs. Metastatic Breast cancer, Pleural Effusion likely Malignant Effusion from metastatic disease. Patient refused treatment for Breast ca as per family."    Pt seen for nutrition assessment secondary to metastatic breast CA. As per social work note, pt has a h/o schizophrenia and has a h/o breast cancer with mets, although pt denies. Visited pt this afternoon, pt resting in bed during time of visit. Limited subjective hx obtained- pt lethargic and unwilling to answer all questions at this time. Pt reports not feeling hungry at time of visit. Observed untouched meal tray at pt's bedside. No po intakes documented thus far. Pt is blind, needs assistance with feeding. Pt reports no difficulty chewing/swallowing, although pt has no teeth and does not wear dentures. Will provide soft foods (diet office informed). Recommend SLP evaluation to assess pt's swallow function. No food allergies. Denies N/V. Reports diarrhea PTA. + BM 3/13. CBW on admission 140#. Pt unaware of UBW. 1+ BL foot edema noted. Skin: ecchymosis. At baseline, pt reports good appetite, typically consumes 3 meals/day (likes tomato juice, applesauce, ice cream- pt unable to provide addtl meal preferences at this time). Encouraged po intake during time of visit. Discussed nutritional supplements, pt declined reports she has tried in the past and does not like ensure or boost. Pt willing to try magic cup fortified ice cream, will provide TID for addtl kcal/protein (290kcal, 9g protein per 4 oz cup). Pt presently on DASH/TLC diet. Receiving IVFs; NaCl @ 50ml/hr. Recommend assistance and encouragement at meal times. Psych and palliative care consults pending. RD to follow-up and will continue to monitor pt's nutritional status.

## 2022-03-13 NOTE — PROGRESS NOTE ADULT - ASSESSMENT
# Metastatic Breast cancer  Pleural Effusion likely Malignant Effusion from metastatic disease   Ascites     Patient refused treatment for Breast ca as per family   Will get Palliative care consult for GOC   Pulm consult ??Thoracentesis   Nebs     # KEATON on CKD Bladder sono   Renal consult appreciated   Gentle hydration   Monitor renal function     Psych consult to assess capacity to take medical decisions   - prognosis poor       # Metastatic Breast cancer  Pleural Effusion likely Malignant Effusion from metastatic disease   Ascites Paracentesis     Patient refused treatment for Breast ca as per family   Will get Palliative care consult for GOC   Pulm consult ??Thoracentesis   Nebs     # KEATON on CKD Bladder sono   Renal following   Gentle hydration   Monitor renal function     Psych consult to assess capacity to take medical decisions   - prognosis poor  Palliative care/ Hospice care

## 2022-03-13 NOTE — PROGRESS NOTE ADULT - SUBJECTIVE AND OBJECTIVE BOX
Patient is a 67y old  Female who presents with a chief complaint of     SUBJECTIVE / OVERNIGHT EVENTS: no events over night     T(C): 36.6 (03-13-22 @ 09:45), Max: 36.6 (03-13-22 @ 09:45)  HR: 105 (03-13-22 @ 09:45) (105 - 105)  BP: 134/54 (03-13-22 @ 09:45) (134/54 - 134/54)  RR: 24 (03-13-22 @ 09:45) (24 - 24)  SpO2: 96% (03-13-22 @ 09:45) (96% - 96%)      MEDICATIONS  (STANDING):  pantoprazole  Injectable 40 milliGRAM(s) IV Push daily  sodium chloride 0.9%. 1000 milliLiter(s) (50 mL/Hr) IV Continuous <Continuous>    MEDICATIONS  (PRN):  ALBUTerol    90 MICROgram(s) HFA Inhaler 2 Puff(s) Inhalation every 6 hours PRN Shortness of Breath and/or Wheezing  morphine  - Injectable 2 milliGRAM(s) IV Push every 6 hours PRN Severe Pain (7 - 10)      PHYSICAL EXAM:  GENERAL: NAD, well-developed  HEAD:  Atraumatic, Normocephalic  EYES: EOMI, PERRLA, conjunctiva and sclera clear  NECK: Supple, No JVD  CHEST/LUNG: Clear to auscultation bilaterally; No wheeze  HEART: Regular rate and rhythm; No murmurs, rubs, or gallops  ABDOMEN: Soft, Nontender, Nondistended; Bowel sounds present  EXTREMITIES:  2+ Peripheral Pulses, No clubbing, cyanosis, or edema  PSYCH: AAOx3  NEUROLOGY: non-focal  SKIN: No rashes or lesions                          10.3   9.87  )-----------( 278      ( 12 Mar 2022 16:53 )             32.0               CAPILLARY BLOOD GLUCOSE          RADIOLOGY & ADDITIONAL TESTS:    Imaging Personally Reviewed:    Consultant(s) Notes Reviewed:      Care Discussed with Consultants/Other Providers:

## 2022-03-13 NOTE — PROGRESS NOTE ADULT - SUBJECTIVE AND OBJECTIVE BOX
Denies CP, SOB, N/V    Vital Signs Last 24 Hrs  T(C): 36.6 (03-13-22 @ 09:45), Max: 36.6 (03-13-22 @ 04:47)  T(F): 97.8 (03-13-22 @ 09:45), Max: 97.9 (03-13-22 @ 04:47)  HR: 105 (03-13-22 @ 09:45) (86 - 105)  BP: 134/54 (03-13-22 @ 09:45) (103/65 - 135/87)  RR: 24 (03-13-22 @ 09:45) (16 - 24)  SpO2: 96% (03-13-22 @ 09:45) (93% - 96%)    s1s2  b/l air entry  soft, distended  no edema                        10.3   9.87  )-----------( 278      ( 12 Mar 2022 16:53 )             32.0     12 Mar 2022 16:53    135    |  99     |  79     ----------------------------<  101    4.2     |  22     |  3.74     Ca    7.6        12 Mar 2022 16:53    ALBUTerol    90 MICROgram(s) HFA Inhaler 2 Puff(s) Inhalation every 6 hours PRN  morphine  - Injectable 2 milliGRAM(s) IV Push every 6 hours PRN  pantoprazole  Injectable 40 milliGRAM(s) IV Push daily  sodium chloride 0.9%. 1000 milliLiter(s) IV Continuous <Continuous>    A/P:    Per chart hx of schizophrenia  Pt w/o complaints w/poor knowledge and understanding of her medical condition  Appears to have breast ca metastatic to liver, bone, possibly lungs  Ascites  CT w/mild fullness of L collecting system  KEATON in setting of above although baseline renal fx is unknown  Will order renal SONO  NS at 50cc/hr  Overall options are limited and prognosis is poor  Consider heme/onc eval  Palliative care eval would be appropriate  Will f/u [facility]    [phone]       Denies CP, SOB, N/V, refused blood work this am    Vital Signs Last 24 Hrs  T(C): 36.6 (03-13-22 @ 09:45), Max: 36.6 (03-13-22 @ 04:47)  T(F): 97.8 (03-13-22 @ 09:45), Max: 97.9 (03-13-22 @ 04:47)  HR: 105 (03-13-22 @ 09:45) (86 - 105)  BP: 134/54 (03-13-22 @ 09:45) (103/65 - 135/87)  RR: 24 (03-13-22 @ 09:45) (16 - 24)  SpO2: 96% (03-13-22 @ 09:45) (93% - 96%)    s1s2  b/l air entry  soft, distended  no edema                        10.3   9.87  )-----------( 278      ( 12 Mar 2022 16:53 )             32.0     12 Mar 2022 16:53    135    |  99     |  79     ----------------------------<  101    4.2     |  22     |  3.74     Ca    7.6        12 Mar 2022 16:53    ALBUTerol    90 MICROgram(s) HFA Inhaler 2 Puff(s) Inhalation every 6 hours PRN  morphine  - Injectable 2 milliGRAM(s) IV Push every 6 hours PRN  pantoprazole  Injectable 40 milliGRAM(s) IV Push daily  sodium chloride 0.9%. 1000 milliLiter(s) IV Continuous <Continuous>    A/P:    Per chart hx of schizophrenia  Pt w/o complaints w/poor knowledge and understanding of her medical condition  Appears to have breast ca metastatic to liver, bone, possibly lungs  Ascites  CT w/mild fullness of L collecting system  KEATON in setting of above although baseline renal fx is unknown  Will order renal SONO  NS at 50cc/hr  Avoid nephrotoxins  Overall options are limited and prognosis is poor  Consider heme/onc eval  Palliative care eval would be appropriate  F/u [facility]    [phone]

## 2022-03-13 NOTE — PROGRESS NOTE ADULT - ASSESSMENT
68 y/o female without reported PMHx BIBA from [facility] due to EMS. As per EMS, patient found on the floor. pt reports she has been having diarrhea, vomiting, and abdominal pain for months. pt reports she fell due to weakness. Reports she came from [place] and needs placement in to nursing home. pt reports she has not seen a doctor in 2 years and not currently taking meds    ex smoker  emphysema  Mets Ca  Liver mets likely  Ascites - Pleural Eff  Breast Ca -   Pulm Nodules  AAA  KEATON      may need paracentesis  CT chest and abd reviewed - c/w mets ca - and atherosclerotic heart disease  pain assessment  goals of care discussion  dvt p  assist with needs - ADL  proventil PRN for sob and or wheezing  spoke with sister - pt needs - assistance with placement - discussion about Hospice  prognosis poor  labs and imaging reviewed with the sister and patient  gentle IVF for KEATON - renal eval noted

## 2022-03-13 NOTE — DIETITIAN INITIAL EVALUATION ADULT. - PERTINENT MEDS FT
MEDICATIONS  (STANDING):  pantoprazole  Injectable 40 milliGRAM(s) IV Push daily  sodium chloride 0.9%. 1000 milliLiter(s) (50 mL/Hr) IV Continuous <Continuous>    MEDICATIONS  (PRN):  ALBUTerol    90 MICROgram(s) HFA Inhaler 2 Puff(s) Inhalation every 6 hours PRN Shortness of Breath and/or Wheezing  morphine  - Injectable 2 milliGRAM(s) IV Push every 6 hours PRN Severe Pain (7 - 10)

## 2022-03-13 NOTE — PROGRESS NOTE ADULT - SUBJECTIVE AND OBJECTIVE BOX
Date/Time Patient Seen:  		  Referring MD:   Data Reviewed	       Patient is a 67y old  Female who presents with a chief complaint of     Subjective/HPI     PAST MEDICAL & SURGICAL HISTORY:  Breast cancer          Medication list         MEDICATIONS  (STANDING):  pantoprazole  Injectable 40 milliGRAM(s) IV Push daily  sodium chloride 0.9%. 1000 milliLiter(s) (50 mL/Hr) IV Continuous <Continuous>    MEDICATIONS  (PRN):  ALBUTerol    90 MICROgram(s) HFA Inhaler 2 Puff(s) Inhalation every 6 hours PRN Shortness of Breath and/or Wheezing  morphine  - Injectable 2 milliGRAM(s) IV Push every 6 hours PRN Severe Pain (7 - 10)         Vitals log        ICU Vital Signs Last 24 Hrs  T(C): 36.6 (13 Mar 2022 04:47), Max: 36.8 (12 Mar 2022 14:01)  T(F): 97.9 (13 Mar 2022 04:47), Max: 98.3 (12 Mar 2022 14:01)  HR: 86 (13 Mar 2022 04:47) (86 - 104)  BP: 135/87 (13 Mar 2022 04:47) (103/65 - 135/87)  BP(mean): --  ABP: --  ABP(mean): --  RR: 17 (13 Mar 2022 04:47) (16 - 17)  SpO2: 96% (13 Mar 2022 04:47) (93% - 97%)           Input and Output:  I&O's Detail      Lab Data                        10.3   9.87  )-----------( 278      ( 12 Mar 2022 16:53 )             32.0     03-12    135  |  99  |  79<H>  ----------------------------<  101<H>  4.2   |  22  |  3.74<H>    Ca    7.6<L>      12 Mar 2022 16:53    TPro  7.0  /  Alb  1.9<L>  /  TBili  0.4  /  DBili  x   /  AST  44<H>  /  ALT  16  /  AlkPhos  131<H>  03-11      CARDIAC MARKERS ( 11 Mar 2022 11:14 )  x     / x     / 327 U/L / x     / x            Review of Systems	      Objective     Physical Examination    heart s1s2  lung dec bS  abd soft  head nc      Pertinent Lab findings & Imaging      Paulina:  NO   Adequate UO     I&O's Detail           Discussed with:     Cultures:	  Culture Results:   GI PCR Results: NOT detected  *******Please Note:*******  GI panel PCR evaluates for:  Campylobacter, Plesiomonas shigelloides, Salmonella,  Vibrio, Yersinia enterocolitica, Enteroaggregative  Escherichia coli (EAEC), Enteropathogenic E.coli (EPEC),  Enterotoxigenic E. coli (ETEC) lt/st, Shiga-like  toxin-producing E. coli (STEC) stx1/stx2,  Shigella/ Enteroinvasive E. coli (EIEC), Cryptosporidium,  Cyclospora cayetanensis, Entamoeba histolytica,  Giardia lamblia, Adenovirus F 40/41, Astrovirus,  Norovirus GI/GII, Rotavirus A, Sapovirus (03-12 @ 12:12)        Radiology

## 2022-03-14 PROCEDURE — 76775 US EXAM ABDO BACK WALL LIM: CPT | Mod: 26

## 2022-03-14 PROCEDURE — 99232 SBSQ HOSP IP/OBS MODERATE 35: CPT

## 2022-03-14 PROCEDURE — 99223 1ST HOSP IP/OBS HIGH 75: CPT

## 2022-03-14 RX ORDER — HYDROMORPHONE HYDROCHLORIDE 2 MG/ML
2 INJECTION INTRAMUSCULAR; INTRAVENOUS; SUBCUTANEOUS EVERY 6 HOURS
Refills: 0 | Status: DISCONTINUED | OUTPATIENT
Start: 2022-03-14 | End: 2022-03-18

## 2022-03-14 RX ORDER — OXYCODONE HYDROCHLORIDE 5 MG/1
5 TABLET ORAL ONCE
Refills: 0 | Status: DISCONTINUED | OUTPATIENT
Start: 2022-03-14 | End: 2022-03-14

## 2022-03-14 RX ORDER — HYDROMORPHONE HYDROCHLORIDE 2 MG/ML
1 INJECTION INTRAMUSCULAR; INTRAVENOUS; SUBCUTANEOUS EVERY 6 HOURS
Refills: 0 | Status: DISCONTINUED | OUTPATIENT
Start: 2022-03-14 | End: 2022-03-18

## 2022-03-14 RX ADMIN — OXYCODONE HYDROCHLORIDE 5 MILLIGRAM(S): 5 TABLET ORAL at 22:38

## 2022-03-14 RX ADMIN — OXYCODONE HYDROCHLORIDE 5 MILLIGRAM(S): 5 TABLET ORAL at 23:05

## 2022-03-14 NOTE — PROGRESS NOTE ADULT - SUBJECTIVE AND OBJECTIVE BOX
Patient is a 67y old  Female who presents with a chief complaint of     SUBJECTIVE / OVERNIGHT EVENTS: no events over night     T(C): 36.9 (03-14-22 @ 18:39), Max: 36.9 (03-14-22 @ 18:39)  HR: 88 (03-14-22 @ 18:39) (88 - 88)  BP: 112/69 (03-14-22 @ 18:39) (112/69 - 112/69)  RR: 15 (03-14-22 @ 18:39) (15 - 15)  SpO2: 98% (03-14-22 @ 18:39) (98% - 98%)      MEDICATIONS  (STANDING):  pantoprazole  Injectable 40 milliGRAM(s) IV Push daily  sodium chloride 0.9%. 1000 milliLiter(s) (50 mL/Hr) IV Continuous <Continuous>    MEDICATIONS  (PRN):  ALBUTerol    90 MICROgram(s) HFA Inhaler 2 Puff(s) Inhalation every 6 hours PRN Shortness of Breath and/or Wheezing  HYDROmorphone   Tablet 2 milliGRAM(s) Oral every 6 hours PRN Severe Pain (7 - 10)  HYDROmorphone   Tablet 1 milliGRAM(s) Oral every 6 hours PRN Moderate Pain (4 - 6)  NECK: Supple, No JVD  CHEST/LUNG: Clear to auscultation bilaterally; No wheeze  HEART: Regular rate and rhythm; No murmurs, rubs, or gallops  ABDOMEN: Soft, Nontender, Nondistended; Bowel sounds present  EXTREMITIES:  2+ Peripheral Pulses, No clubbing, cyanosis, or edema  PSYCH: AAOx3  NEUROLOGY: non-focal  SKIN: No rashes or lesions                            CAPILLARY BLOOD GLUCOSE          RADIOLOGY & ADDITIONAL TESTS:    Imaging Personally Reviewed:    Consultant(s) Notes Reviewed:      Care Discussed with Consultants/Other Providers:

## 2022-03-14 NOTE — BH CONSULTATION LIAISON ASSESSMENT NOTE - NSBHCHARTREVIEWLAB_PSY_A_CORE FT
10.3   9.87  )-----------( 278      ( 12 Mar 2022 16:53 )             32.0   03-12    135  |  99  |  79<H>  ----------------------------<  101<H>  4.2   |  22  |  3.74<H>    Ca    7.6<L>      12 Mar 2022 16:53

## 2022-03-14 NOTE — BH CONSULTATION LIAISON ASSESSMENT NOTE - HPI (INCLUDE ILLNESS QUALITY, SEVERITY, DURATION, TIMING, CONTEXT, MODIFYING FACTORS, ASSOCIATED SIGNS AND SYMPTOMS)
Patient seen, evaluated and chart reviewed. Patient is a 66 y/o female without reported PMHx BIBA from [facility] via EMS after patient found on the floor. Patient reportedly had diarrhea, vomiting and increased abdominal pain and girth. She has poorly managed psychiatric disease with schizophrenia, reportedly liver cirrhosis and blindness which she does not acknowledge. She had not seen a doctor for at least 2 years and was visiting her brother in [place] where she was admitted to hospital ([facility]) where she was found to have breast mass and bone lesions suspicious for metastatic disease. She reports fluid was removed from her abdomen but does not know any results and adamantly refuses to have additional procedures done. She was transferred to nursing home in [place] but did not wish to stay there and was brought back to [place] by her brother. She is noted to have extensive abnormalities on CT including breast mass, pleural effusion, ascites and bone lesions all c/w metastatic disease. Patient initially refused to engage and the issue of capacity was raised. Patient is lucid, but has very limited understanding of her medical situation.

## 2022-03-14 NOTE — CONSULT NOTE ADULT - ASSESSMENT
68 yo woman with history of psychiatric disease, per family with schizophrenia, known blindness and liver cirrhosis, noted at time of evaluation in hospital in [place] ([facility]) to have right breast mass with bone lesions and abdominal ascites, was discharged to skilled nursing facility there and did not want to stay; brought back to [place] by her brother and found on floor in [facility]; brought to ER for evaluation    - imaging studies most c/w metastatic breast cancer involving bones and liver as well as likely malignant ascites  - patient or her health care agent would need to agree to biopsy for pathologic diagnosis prior to making any recommendations for treatment options  - given her lack of insight, poor performance status, poor renal function, compromised liver due to cirrhosis, advanced disease and lack of social support, doubt she would be an appropriate candidate for aggressive or even hormonal treatment if disease is Hormone responsive  - case discussed at length with patient's sister (Katarina [phone]) and advised that palliative measures are most appropriate  - case discussed with Dr. Douglas (palliative care) and Dr. Gay (pulmonary); message left for Dr. Lamar  - please call with any additional questions

## 2022-03-14 NOTE — BH CONSULTATION LIAISON ASSESSMENT NOTE - NSBHCHARTREVIEWINVESTIGATE_PSY_A_CORE FT
< from: 12 Lead ECG (03.11.22 @ 10:46) >    Ventricular Rate 98 BPM    Atrial Rate 98 BPM    P-R Interval 126 ms    QRS Duration 78 ms    Q-T Interval 346 ms    QTC Calculation(Bazett) 441 ms    P Axis 83 degrees    R Axis 39 degrees    T Axis 84 degrees    Diagnosis Line Normal sinus rhythm  Normal ECG  No previous ECGs available  Confirmed by Palla MD, Odell ([id number]) on 3/11/2022 5:45:30 PM    < end of copied text >

## 2022-03-14 NOTE — BH CONSULTATION LIAISON ASSESSMENT NOTE - CURRENT MEDICATION
MEDICATIONS  (STANDING):  pantoprazole  Injectable 40 milliGRAM(s) IV Push daily  sodium chloride 0.9%. 1000 milliLiter(s) (50 mL/Hr) IV Continuous <Continuous>    MEDICATIONS  (PRN):  ALBUTerol    90 MICROgram(s) HFA Inhaler 2 Puff(s) Inhalation every 6 hours PRN Shortness of Breath and/or Wheezing  HYDROmorphone   Tablet 2 milliGRAM(s) Oral every 6 hours PRN Severe Pain (7 - 10)  HYDROmorphone   Tablet 1 milliGRAM(s) Oral every 6 hours PRN Moderate Pain (4 - 6)

## 2022-03-14 NOTE — PROGRESS NOTE ADULT - ASSESSMENT
# Metastatic Breast cancer  Pleural Effusion likely Malignant Effusion from metastatic disease   Ascites Paracentesis     Patient refused treatment for Breast ca as per family   Will get Palliative care consult for GOC   Pulm consult ??Thoracentesis   Nebs     # KEATON on CKD Bladder sono   Renal following   Gentle hydration   Monitor renal function     Psych consult to assess capacity to take medical decisions -Patient does not have capacity to  take decision  Hem Onc eval appreciated given advance stage of malignancy poor performance status patient not a candidate for chemotherapy. Family agrees with palliative care consult.   - prognosis poor  Palliative care/ Hospice care

## 2022-03-14 NOTE — CONSULT NOTE ADULT - SUBJECTIVE AND OBJECTIVE BOX
Patient is a 67y old  Female who presents with a chief complaint of     HPI:  68 y/o female without reported PMHx BIBA from [facility] via EMS after patient found on the floor. Patient unable to give any details of her history and essentially all information obtained from the chart and from her sister Katarina [phone]. Patient reportedly had diarrhea, vomiting and increased abdominal pain and girth. She has poorly managed psychiatric disease with schizophrenia, reportedly liver cirrhosis and blindness which she does not acknowledge. She had not seen a doctor for at least 2 years and was visiting her brother in [place] where she was admitted to hospital ([facility] where she was found to have breast mass and bone lesions suspicious for metastatic disease. She reports fluid was removed from her abdomen but does not know any results and adamantly refuses to have additional procedures done. She was transferred to nursing home in [place] but did not wish to stay there and was brought back to [place] by her brother.  She is noted to have extensive abnormalities on CT including breast mass, pleural effusion, ascites and bone lesions all c/w metastatic disease.  Asked by primary team to evaluate patient.    ROS:  Negative except for: reports her "body is on fire"; overall tangential; does not participate in interview    PAST MEDICAL & SURGICAL HISTORY:  Breast cancer - no clear pathology  Schizophrenia per family      SOCIAL HISTORY:  + tobacco use  unknown ETOH use  presented from hotel after being found on floor    FAMILY HISTORY:  Sister - treated for cancer/in remission    MEDICATIONS  (STANDING):  pantoprazole  Injectable 40 milliGRAM(s) IV Push daily  sodium chloride 0.9%. 1000 milliLiter(s) (50 mL/Hr) IV Continuous <Continuous>    MEDICATIONS  (PRN):  ALBUTerol    90 MICROgram(s) HFA Inhaler 2 Puff(s) Inhalation every 6 hours PRN Shortness of Breath and/or Wheezing      Allergies    No Known Allergies    Intolerances        Vital Signs Last 24 Hrs  T(C): 36.6 (14 Mar 2022 10:43), Max: 36.8 (13 Mar 2022 23:00)  T(F): 97.8 (14 Mar 2022 10:43), Max: 98.2 (13 Mar 2022 23:00)  HR: 64 (14 Mar 2022 10:43) (64 - 92)  BP: 128/77 (14 Mar 2022 10:43) (90/56 - 138/62)  BP(mean): --  RR: 18 (14 Mar 2022 10:43) (18 - 18)  SpO2: 96% (14 Mar 2022 10:43) (96% - 98%)    PHYSICAL EXAM  General: adult woman appears much older than stated age  HEENT: clear oropharynx, anicteric sclera, pink conjunctivae  Neck: supple  CV: normal S1S2 with no murmur rubs or gallops  Lungs: reduced breath sounds bilateral bases  Abdomen: soft non-tender; distended with non-tense ascites,   Ext: no clubbing cyanosis or edema  Skin: no rashes and no petichiae  Neuro: alert; oriented to self and place; no focal deficits; tangential      LABS:    CBC Full  -  ( 12 Mar 2022 16:53 )  WBC Count : 9.87 K/uL  RBC Count : 3.96 M/uL  Hemoglobin : 10.3 g/dL  Hematocrit : 32.0 %  Platelet Count - Automated : 278 K/uL  Mean Cell Volume : 80.8 fl  Mean Cell Hemoglobin : 26.0 pg  Mean Cell Hemoglobin Concentration : 32.2 gm/dL  Auto Neutrophil # : 7.49 K/uL  Auto Lymphocyte # : 1.87 K/uL  Auto Monocyte # : 0.43 K/uL  Auto Eosinophil # : 0.03 K/uL  Auto Basophil # : 0.01 K/uL  Auto Neutrophil % : 75.9 %  Auto Lymphocyte % : 18.9 %  Auto Monocyte % : 4.4 %  Auto Eosinophil % : 0.3 %  Auto Basophil % : 0.1 %    03-12    135  |  99  |  79<H>  ----------------------------<  101<H>  4.2   |  22  |  3.74<H>    Ca    7.6<L>      12 Mar 2022 16:53        RADIOLOGY :  CT Chest/Abd/Plv non-contrast  IMPRESSION:    Limited noncontrast study.    CHEST:  1. Right breast mass measures 2.2 cm concerning for neoplasm.  2. Small pleural effusions. Emphysema.  3. Indeterminate 5 mm right upper lobe nodule.  4. Aortic valve and coronary calcification.    ABDOMEN/PELVIS:  1. Proctitis.  2. Large ascites. Apparent right pelvic dependent soft tissue thickening,   image 233 series 3. Recommend correlation with postcontrast imaging to exclude peritoneal disease.  3. Lobulated liver contour. Numerous hepatic hypodensities several which are indeterminate. Metastatic disease cannot be excluded.  4. Diffuse osseous heterogeneity suggestive of metastatic disease.  5. There is a 3.5 cm abdominal aortic aneurysm.

## 2022-03-14 NOTE — CONSULT NOTE ADULT - SUBJECTIVE AND OBJECTIVE BOX
HPI:  68 y/o F without reported PMHx BIBA from [facility] due to EMS. As per EMS, patient found on the floor. pt reports she has been having diarrhea, vomiting, and abdominal pain for months. pt reports she fell due to weakness. Reports she came from [place] and needs placement in to nursing home. pt reports she has not seen a doctor in 2 years and not currently taking meds    PERTINENT PM/SXH:   Breast cancer    FAMILY HISTORY:    ITEMS NOT CHECKED ARE NOT PRESENT    SOCIAL HISTORY:   Significant other/partner[ ]  Children[ ]  Voodoo/Spirituality:  Substance hx:  [ ]   Tobacco hx:  [x ]   Alcohol hx: [ ]   Home Opioid hx:  [ ] I-Stop Reference No:  Living Situation: [x ]Home  [ ]Long term care  [ ]Rehab [ ]Other    ADVANCE DIRECTIVES:    DNR  MOLST  [ ]  Living Will  [ ]   DECISION MAKER(s):  [ ] Health Care Proxy(s)  [ ] Surrogate(s)  [ ] Guardian           Name(s): Phone Number(s):    BASELINE (I)ADL(s) (prior to admission):  [place]: [ ]Total  [x ] Moderate [ ]Dependent    Allergies    No Known Allergies    Intolerances    MEDICATIONS  (STANDING):  pantoprazole  Injectable 40 milliGRAM(s) IV Push daily  sodium chloride 0.9%. 1000 milliLiter(s) (50 mL/Hr) IV Continuous <Continuous>    MEDICATIONS  (PRN):  ALBUTerol    90 MICROgram(s) HFA Inhaler 2 Puff(s) Inhalation every 6 hours PRN Shortness of Breath and/or Wheezing  morphine  - Injectable 2 milliGRAM(s) IV Push every 6 hours PRN Severe Pain (7 - 10)    PRESENT SYMPTOMS: [x ]Unable to obtain due to poor mentation   Source if other than patient:  [ ]Family   [ ]Team     Pain: [ ]yes [ ]no  QOL impact -   Location -                    Aggravating factors -  Quality -  Radiation -  Timing-  Severity (0-10 scale):  Minimal acceptable level (0-10 scale):     CPOT:    https://www.sccm.org/getattachment/nyy70k16-9f4h-1q8x-7u3b-3825q6526u9l/Critical-Care-Pain-Observation-Tool-(CPOT)      PAIN AD Score:     http://geriatrictoolkit.[facility]/cog/painad.pdf (press ctrl +  left click to view)    Dyspnea:                           [ ]Mild [ ]Moderate [ ]Severe  Anxiety:                             [ ]Mild [ ]Moderate [ ]Severe  Fatigue:                             [ ]Mild [ ]Moderate [ ]Severe  Nausea:                             [ ]Mild [ ]Moderate [ ]Severe  Loss of appetite:              [ ]Mild [ ]Moderate [ ]Severe  Constipation:                    [ ]Mild [ ]Moderate [ ]Severe    Other Symptoms:  [ ]All other review of systems negative     Palliative Performance Status Version 2:         %    http://[facility].org/files/news/palliative_performance_scale_ppsv2.pdf  PHYSICAL EXAM:  Vital Signs Last 24 Hrs  T(C): 36.6 (14 Mar 2022 10:43), Max: 36.8 (13 Mar 2022 23:00)  T(F): 97.8 (14 Mar 2022 10:43), Max: 98.2 (13 Mar 2022 23:00)  HR: 64 (14 Mar 2022 10:43) (64 - 92)  BP: 128/77 (14 Mar 2022 10:43) (90/56 - 138/62)  BP(mean): --  RR: 18 (14 Mar 2022 10:43) (18 - 18)  SpO2: 96% (14 Mar 2022 10:43) (96% - 98%) I&O's Summary      LABS:                        10.3   9.87  )-----------( 278      ( 12 Mar 2022 16:53 )             32.0   03-12    135  |  99  |  79<H>  ----------------------------<  101<H>  4.2   |  22  |  3.74<H>    Ca    7.6<L>      12 Mar 2022 16:53          RADIOLOGY & ADDITIONAL STUDIES: reviewed    PROTEIN CALORIE MALNUTRITION PRESENT: [ ]mild [ ]moderate [ ]severe [ ]underweight [ ]morbid obesity  https://www.andeal.org/vault/2440/web/files/ONC/Table_Clinical%20Characteristics%20to%20Document%20Malnutrition-White%20JV%20et%20al%202012.pdf    Height (cm): 157.5 (03-11-22 @ 19:23)  Weight (kg): 63.5 (03-11-22 @ 19:23)  BMI (kg/m2): 25.6 (03-11-22 @ 19:23)    [ ]PPSV2 < or = to 30% [ ]significant weight loss  [ ]poor nutritional intake  [ ]anasarca      [ ]Artificial Nutrition      REFERRALS:   [ ]Chaplaincy  [ ]Hospice  [ ]Child Life  [ ]Social Work  [ ]Case management [ ]Holistic Therapy     Goals of Care Document:  HPI:  66 y/o F without reported PMHx BIBA from [facility] due to EMS. As per EMS, patient found on the floor. pt reports she has been having diarrhea, vomiting, and abdominal pain for months. pt reports she fell due to weakness. Reports she came from [place] and needs placement in to nursing home. pt reports she has not seen a doctor in 2 years and not currently taking meds    PERTINENT PM/SXH:   Breast cancer    FAMILY HISTORY:    ITEMS NOT CHECKED ARE NOT PRESENT    SOCIAL HISTORY:   Significant other/partner[ ]  Children[ ]  Cheondoism/Spirituality:  Substance hx:  [ ]   Tobacco hx:  [x ]   Alcohol hx: [ ]   Home Opioid hx:  [ ] I-Stop Reference No:  Living Situation: [x ]Home  [ ]Long term care  [ ]Rehab [ ]Other    ADVANCE DIRECTIVES:    DNR  MOLST  [ ]  Living Will  [ ]   DECISION MAKER(s):  [ ] Health Care Proxy(s)  [ x] Surrogate(s)  [ ] Guardian           Name(s): sister Katarina  Phone Number(s):    BASELINE (I)ADL(s) (prior to admission):  [place]: [ ]Total  [x ] Moderate [ ]Dependent    Allergies    No Known Allergies    Intolerances    MEDICATIONS  (STANDING):  pantoprazole  Injectable 40 milliGRAM(s) IV Push daily  sodium chloride 0.9%. 1000 milliLiter(s) (50 mL/Hr) IV Continuous <Continuous>    MEDICATIONS  (PRN):  ALBUTerol    90 MICROgram(s) HFA Inhaler 2 Puff(s) Inhalation every 6 hours PRN Shortness of Breath and/or Wheezing  morphine  - Injectable 2 milliGRAM(s) IV Push every 6 hours PRN Severe Pain (7 - 10)    PRESENT SYMPTOMS: [x ]Unable to obtain due to poor mentation   Source if other than patient:  [ ]Family   [ ]Team     Pain: [ ]yes [ ]no  QOL impact -   Location -                    Aggravating factors -  Quality -  Radiation -  Timing-  Severity (0-10 scale):  Minimal acceptable level (0-10 scale):     CPOT:    https://www.sccm.org/getattachment/got64c02-1u6t-4c9g-7k6z-8217x0598e2w/Critical-Care-Pain-Observation-Tool-(CPOT)      PAIN AD Score:     http://geriatrictoolkit.[facility]/cog/painad.pdf (press ctrl +  left click to view)    Dyspnea:                           [ ]Mild [ ]Moderate [ ]Severe  Anxiety:                             [ ]Mild [ ]Moderate [ ]Severe  Fatigue:                             [ ]Mild [ ]Moderate [ ]Severe  Nausea:                             [ ]Mild [ ]Moderate [ ]Severe  Loss of appetite:              [ ]Mild [ ]Moderate [ ]Severe  Constipation:                    [ ]Mild [ ]Moderate [ ]Severe    Other Symptoms:  [ ]All other review of systems negative     Palliative Performance Status Version 2:         %    http://npcrc.org/files/news/palliative_performance_scale_ppsv2.pdf  PHYSICAL EXAM:  Vital Signs Last 24 Hrs  T(C): 36.6 (14 Mar 2022 10:43), Max: 36.8 (13 Mar 2022 23:00)  T(F): 97.8 (14 Mar 2022 10:43), Max: 98.2 (13 Mar 2022 23:00)  HR: 64 (14 Mar 2022 10:43) (64 - 92)  BP: 128/77 (14 Mar 2022 10:43) (90/56 - 138/62)  BP(mean): --  RR: 18 (14 Mar 2022 10:43) (18 - 18)  SpO2: 96% (14 Mar 2022 10:43) (96% - 98%) I&O's Summary    GENERAL: NAD, well-developed  HEAD:  Atraumatic, Normocephalic  EYES: EOMI, PERRLA, conjunctiva and sclera clear  NECK: Supple, No JVD  CHEST/LUNG: Clear to auscultation bilaterally; No wheeze  HEART: Regular rate and rhythm; No murmurs, rubs, or gallops  ABDOMEN: Soft, Nontender, Nondistended; Bowel sounds present  EXTREMITIES:  2+ Peripheral Pulses, No clubbing, cyanosis, or edema  PSYCH: AAOx3 but no decisional capacity  NEUROLOGY: non-focal  SKIN: No rashes or lesions    LABS:                        10.3   9.87  )-----------( 278      ( 12 Mar 2022 16:53 )             32.0   03-12    135  |  99  |  79<H>  ----------------------------<  101<H>  4.2   |  22  |  3.74<H>    Ca    7.6<L>      12 Mar 2022 16:53          RADIOLOGY & ADDITIONAL STUDIES: reviewed    PROTEIN CALORIE MALNUTRITION PRESENT: [ ]mild [ ]moderate [ ]severe [ ]underweight [ ]morbid obesity  https://www.andeal.org/vault/2440/web/files/ONC/Table_Clinical%20Characteristics%20to%20Document%20Malnutrition-White%20JV%20et%20al%202012.pdf    Height (cm): 157.5 (03-11-22 @ 19:23)  Weight (kg): 63.5 (03-11-22 @ 19:23)  BMI (kg/m2): 25.6 (03-11-22 @ 19:23)    [ ]PPSV2 < or = to 30% [ ]significant weight loss  [ ]poor nutritional intake  [ ]anasarca      [ ]Artificial Nutrition      REFERRALS:   [ ]Chaplaincy  [ ]Hospice  [ ]Child Life  [ ]Social Work  [ ]Case management [ ]Holistic Therapy     Goals of Care Document:

## 2022-03-14 NOTE — PROGRESS NOTE ADULT - ASSESSMENT
68 y/o female without reported PMHx BIBA from [facility] due to EMS. As per EMS, patient found on the floor. pt reports she has been having diarrhea, vomiting, and abdominal pain for months. pt reports she fell due to weakness. Reports she came from [place] and needs placement in to nursing home. pt reports she has not seen a doctor in 2 years and not currently taking meds    ex smoker  emphysema  Mets Ca  Liver mets likely  Ascites - Pleural Eff  Breast Ca -   Pulm Nodules  AAA  KEATON      may need paracentesis  CT chest and abd reviewed - c/w mets ca - and atherosclerotic heart disease  pain assessment  goals of care discussion  dvt p  assist with needs - ADL  proventil PRN for sob and or wheezing  spoke with sister - pt needs - assistance with placement - discussion about Hospice  prognosis poor  labs and imaging reviewed with the sister and patient  gentle IVF for KEATON - renal eval noted

## 2022-03-14 NOTE — BH CONSULTATION LIAISON ASSESSMENT NOTE - NSBHCHARTREVIEWVS_PSY_A_CORE FT
Vital Signs Last 24 Hrs  T(C): 36.7 (14 Mar 2022 14:43), Max: 36.8 (13 Mar 2022 23:00)  T(F): 98.1 (14 Mar 2022 14:43), Max: 98.2 (13 Mar 2022 23:00)  HR: 98 (14 Mar 2022 14:43) (64 - 98)  BP: 110/61 (14 Mar 2022 14:43) (90/56 - 138/62)  BP(mean): --  RR: 18 (14 Mar 2022 14:43) (18 - 18)  SpO2: 99% (14 Mar 2022 14:43) (96% - 99%)

## 2022-03-14 NOTE — CONSULT NOTE ADULT - ASSESSMENT
66 y/o female without reported PMHx BIBA from [facility] due to EMS. As per EMS, patient found on the floor. pt reports she has been having diarrhea, vomiting, and abdominal pain for months. pt reports she fell due to weakness. Reports she came from [place] and needs placement in to nursing home. pt reports she has not seen a doctor in 2 years and not currently taking meds.     ex smoker  emphysema  Mets Ca  Liver mets likely  Ascites - Pleural Eff  Breast Ca -   Pulm Nodules  AAA  KEATON    #Goals of care/advance care planning  - Palliative performance scale score: 40% (poor)  - Prognosis: days-weeks (pt is hospice eligible)   - Code status: DNR/DNI, no feeding (MOLST form filled out and placed in the chart)  - GOC:NH with hospice/comfort care.   - Surrogate: sister Maryan  - Psychosocial support provided    Appreciate consult. Discussed with the primary team.    Kassidy Douglas MD, CM-C   68 y/o female without reported PMHx BIBA from [facility] due to EMS. As per EMS, patient found on the floor. pt reports she has been having diarrhea, vomiting, and abdominal pain for months. pt reports she fell due to weakness. Reports she came from [place] and needs placement in to nursing home. pt reports she has not seen a doctor in 2 years and not currently taking meds.     #Stage 4 breast cancer  Patient declined treatment as per family     #Pleural Effusion likely Malignant from metastatic disease   thoracentesis    #Ascites   Paracentesis     # KEATON on CKD    Renal following   Gentle hydration   Monitor renal function     #Schizophrenia  Psych recs noted; pt lack capacity to take medical decisions     #Cancer pain  d/c morphine due to KEATON  started dilaudid prn    #Goals of care/advance care planning  - Palliative performance scale score: 40% (poor)  - Prognosis: days-weeks (pt is hospice eligible)   - Code status: DNR/DNI, no feeding (MOLST form filled out and placed in the chart)  - GOC: NH with hospice/comfort care.   - Surrogate: sister Katarina  - Psychosocial support provided    Appreciate consult. Discussed with the primary team.    Kassidy Douglas MD, CM-C

## 2022-03-14 NOTE — CONSULT NOTE ADULT - CONSULT REASON
KEATON
Breast mass with suspected metastatic disease - R92.8, R93.7, R93.2, J91.0, R18.8
ascites  effusion  atelectasis  breast ca  psychiatric illness history
GOC

## 2022-03-14 NOTE — PROGRESS NOTE ADULT - SUBJECTIVE AND OBJECTIVE BOX
Date/Time Patient Seen:  		  Referring MD:   Data Reviewed	       Patient is a 67y old  Female who presents with a chief complaint of     Subjective/HPI     PAST MEDICAL & SURGICAL HISTORY:  Breast cancer          Medication list         MEDICATIONS  (STANDING):  pantoprazole  Injectable 40 milliGRAM(s) IV Push daily  sodium chloride 0.9%. 1000 milliLiter(s) (50 mL/Hr) IV Continuous <Continuous>    MEDICATIONS  (PRN):  ALBUTerol    90 MICROgram(s) HFA Inhaler 2 Puff(s) Inhalation every 6 hours PRN Shortness of Breath and/or Wheezing  morphine  - Injectable 2 milliGRAM(s) IV Push every 6 hours PRN Severe Pain (7 - 10)         Vitals log        ICU Vital Signs Last 24 Hrs  T(C): 36.5 (14 Mar 2022 06:21), Max: 36.8 (13 Mar 2022 23:00)  T(F): 97.7 (14 Mar 2022 06:21), Max: 98.2 (13 Mar 2022 23:00)  HR: 91 (14 Mar 2022 06:21) (91 - 105)  BP: 90/56 (14 Mar 2022 06:21) (90/56 - 138/62)  BP(mean): --  ABP: --  ABP(mean): --  RR: 18 (14 Mar 2022 06:21) (18 - 24)  SpO2: 98% (14 Mar 2022 06:21) (96% - 98%)           Input and Output:  I&O's Detail      Lab Data                        10.3   9.87  )-----------( 278      ( 12 Mar 2022 16:53 )             32.0     03-12    135  |  99  |  79<H>  ----------------------------<  101<H>  4.2   |  22  |  3.74<H>    Ca    7.6<L>      12 Mar 2022 16:53              Review of Systems	      Objective     Physical Examination    heart s1s2  lung dec BS  abd soft  head nc      Pertinent Lab findings & Imaging      Paulina:  NO   Adequate UO     I&O's Detail           Discussed with:     Cultures:	        Radiology

## 2022-03-14 NOTE — BH CONSULTATION LIAISON ASSESSMENT NOTE - ADDITIONAL DETAILS / COMMENTS
Patient is 1) cognitively intact 2) Does not understand the nature of the medical condition, risks, benefits, alternatives and side-effects of treatment 3) Wants to make decisions voluntarily.  At this time patient has no decision making capacity regarding medical decisions.

## 2022-03-14 NOTE — CONSULT NOTE ADULT - CONVERSATION DETAILS
Reviewed patient's medical and social history as well as events leading to patient's hospitalization. Writer discussed patient's current diagnosis (.....), medical condition and management, poor prognosis, and life expectancy. Inquired about patient's wishes regarding extent of medical care to be provided including escalation of medical care into the ICU and use of vasopressor support. In addition, the writer inquired about thoughts regarding cardiopulmonary resuscitation, artificial nutrition and hydration including use of feeding tubes and IVF, antibiotics, and further investigative studies such as blood draws and radiology. Sister showed insight into medical condition. She asked not to have the patient suffer. They have watched their parents suffer at end of life. Their father was in a vegetative state for a long time which was very hard for everyone to watch. She knows that pt has declined work up and follow ups in the past and she understands pt's poor prognosis from her conversations as well with Hem/Onc and hospitalist. All questions answered. Writer recommended comfort focused care wit hospice. Sister consented to DNR/DNI and no feeding tube as well as d/c to NH with hospice/comfort care. MOLST form filled out and placed in chart. Psychosocial support provided.    Due to patient's health status and restrictions on visitations during this Public health emergency, advanced care planning discussion was performed via telephone.

## 2022-03-15 RX ORDER — OXYCODONE HYDROCHLORIDE 5 MG/1
5 TABLET ORAL ONCE
Refills: 0 | Status: DISCONTINUED | OUTPATIENT
Start: 2022-03-15 | End: 2022-03-15

## 2022-03-15 RX ADMIN — OXYCODONE HYDROCHLORIDE 5 MILLIGRAM(S): 5 TABLET ORAL at 09:22

## 2022-03-15 RX ADMIN — HYDROMORPHONE HYDROCHLORIDE 2 MILLIGRAM(S): 2 INJECTION INTRAMUSCULAR; INTRAVENOUS; SUBCUTANEOUS at 16:30

## 2022-03-15 RX ADMIN — HYDROMORPHONE HYDROCHLORIDE 2 MILLIGRAM(S): 2 INJECTION INTRAMUSCULAR; INTRAVENOUS; SUBCUTANEOUS at 08:29

## 2022-03-15 RX ADMIN — HYDROMORPHONE HYDROCHLORIDE 2 MILLIGRAM(S): 2 INJECTION INTRAMUSCULAR; INTRAVENOUS; SUBCUTANEOUS at 09:00

## 2022-03-15 RX ADMIN — HYDROMORPHONE HYDROCHLORIDE 2 MILLIGRAM(S): 2 INJECTION INTRAMUSCULAR; INTRAVENOUS; SUBCUTANEOUS at 15:48

## 2022-03-15 RX ADMIN — HYDROMORPHONE HYDROCHLORIDE 2 MILLIGRAM(S): 2 INJECTION INTRAMUSCULAR; INTRAVENOUS; SUBCUTANEOUS at 21:21

## 2022-03-15 RX ADMIN — HYDROMORPHONE HYDROCHLORIDE 2 MILLIGRAM(S): 2 INJECTION INTRAMUSCULAR; INTRAVENOUS; SUBCUTANEOUS at 21:51

## 2022-03-15 RX ADMIN — OXYCODONE HYDROCHLORIDE 5 MILLIGRAM(S): 5 TABLET ORAL at 10:00

## 2022-03-15 NOTE — PROGRESS NOTE ADULT - SUBJECTIVE AND OBJECTIVE BOX
Date/Time Patient Seen:  		  Referring MD:   Data Reviewed	       Patient is a 67y old  Female who presents with a chief complaint of     Subjective/HPI     PAST MEDICAL & SURGICAL HISTORY:  Breast cancer          Medication list         MEDICATIONS  (STANDING):  pantoprazole  Injectable 40 milliGRAM(s) IV Push daily  sodium chloride 0.9%. 1000 milliLiter(s) (50 mL/Hr) IV Continuous <Continuous>    MEDICATIONS  (PRN):  ALBUTerol    90 MICROgram(s) HFA Inhaler 2 Puff(s) Inhalation every 6 hours PRN Shortness of Breath and/or Wheezing  HYDROmorphone   Tablet 2 milliGRAM(s) Oral every 6 hours PRN Severe Pain (7 - 10)  HYDROmorphone   Tablet 1 milliGRAM(s) Oral every 6 hours PRN Moderate Pain (4 - 6)         Vitals log        ICU Vital Signs Last 24 Hrs  T(C): 36.5 (15 Mar 2022 05:16), Max: 36.9 (14 Mar 2022 18:39)  T(F): 97.7 (15 Mar 2022 05:16), Max: 98.4 (14 Mar 2022 18:39)  HR: 94 (15 Mar 2022 05:16) (64 - 98)  BP: 110/67 (15 Mar 2022 05:16) (90/56 - 128/77)  BP(mean): --  ABP: --  ABP(mean): --  RR: 17 (15 Mar 2022 05:16) (15 - 18)  SpO2: 95% (15 Mar 2022 05:16) (95% - 99%)           Input and Output:  I&O's Detail    14 Mar 2022 07:01  -  15 Mar 2022 06:18  --------------------------------------------------------  IN:    Oral Fluid: 150 mL    sodium chloride 0.9%: 600 mL  Total IN: 750 mL    OUT:    Blood Loss (mL): 1 mL  Total OUT: 1 mL    Total NET: 749 mL          Lab Data                  Review of Systems	      Objective     Physical Examination  heart s1s2  lung dec BS  abd soft        Pertinent Lab findings & Imaging      Paulina:  NO   Adequate UO     I&O's Detail    14 Mar 2022 07:01  -  15 Mar 2022 06:18  --------------------------------------------------------  IN:    Oral Fluid: 150 mL    sodium chloride 0.9%: 600 mL  Total IN: 750 mL    OUT:    Blood Loss (mL): 1 mL  Total OUT: 1 mL    Total NET: 749 mL               Discussed with:     Cultures:	        Radiology

## 2022-03-15 NOTE — PROGRESS NOTE ADULT - ASSESSMENT
66 y/o female without reported PMHx BIBA from [facility] due to EMS. As per EMS, patient found on the floor. pt reports she has been having diarrhea, vomiting, and abdominal pain for months. pt reports she fell due to weakness. Reports she came from [place] and needs placement in to nursing home. pt reports she has not seen a doctor in 2 years and not currently taking meds    ex smoker  emphysema  Mets Ca  Liver mets likely  Ascites - Pleural Eff  Breast Ca -   Pulm Nodules  AAA  KEATON      palliative - heme onc - psych noted  ivf for keaton  palliative measures  opioids prn  oral and skin care  placement and DNR status  assist with needs  mets ca based on imaging - exam and labs

## 2022-03-15 NOTE — PROGRESS NOTE ADULT - SUBJECTIVE AND OBJECTIVE BOX
Patient is a 67y old  Female who presents with a chief complaint of     SUBJECTIVE / OVERNIGHT EVENTS: no events over night       T(C): 36.5 (03-15-22 @ 18:19), Max: 36.5 (03-15-22 @ 18:19)  HR: 91 (03-15-22 @ 18:19) (91 - 103)  BP: 102/62 (03-15-22 @ 18:19) (102/62 - 109/74)  RR: 17 (03-15-22 @ 18:19) (17 - 18)  SpO2: 95% (03-15-22 @ 18:19) (94% - 95%)    MEDICATIONS  (STANDING):  pantoprazole  Injectable 40 milliGRAM(s) IV Push daily  sodium chloride 0.9%. 1000 milliLiter(s) (50 mL/Hr) IV Continuous <Continuous>    MEDICATIONS  (PRN):  ALBUTerol    90 MICROgram(s) HFA Inhaler 2 Puff(s) Inhalation every 6 hours PRN Shortness of Breath and/or Wheezing  HYDROmorphone   Tablet 2 milliGRAM(s) Oral every 6 hours PRN Severe Pain (7 - 10)  HYDROmorphone   Tablet 1 milliGRAM(s) Oral every 6 hours PRN Moderate Pain (4 - 6)          NECK: Supple, No JVD  CHEST/LUNG: Clear to auscultation bilaterally; No wheeze  HEART: Regular rate and rhythm; No murmurs, rubs, or gallops  ABDOMEN: Soft, Nontender, Nondistended; Bowel sounds present  EXTREMITIES:  2+ Peripheral Pulses, No clubbing, cyanosis, or edema  PSYCH: AAOx3  NEUROLOGY: non-focal  SKIN: No rashes or lesions                            CAPILLARY BLOOD GLUCOSE          RADIOLOGY & ADDITIONAL TESTS:    Imaging Personally Reviewed:    Consultant(s) Notes Reviewed:      Care Discussed with Consultants/Other Providers:

## 2022-03-16 PROCEDURE — 99233 SBSQ HOSP IP/OBS HIGH 50: CPT

## 2022-03-16 RX ORDER — PANTOPRAZOLE SODIUM 20 MG/1
40 TABLET, DELAYED RELEASE ORAL DAILY
Refills: 0 | Status: DISCONTINUED | OUTPATIENT
Start: 2022-03-16 | End: 2022-03-18

## 2022-03-16 RX ADMIN — HYDROMORPHONE HYDROCHLORIDE 2 MILLIGRAM(S): 2 INJECTION INTRAMUSCULAR; INTRAVENOUS; SUBCUTANEOUS at 23:59

## 2022-03-16 RX ADMIN — HYDROMORPHONE HYDROCHLORIDE 2 MILLIGRAM(S): 2 INJECTION INTRAMUSCULAR; INTRAVENOUS; SUBCUTANEOUS at 05:15

## 2022-03-16 RX ADMIN — PANTOPRAZOLE SODIUM 40 MILLIGRAM(S): 20 TABLET, DELAYED RELEASE ORAL at 18:48

## 2022-03-16 RX ADMIN — HYDROMORPHONE HYDROCHLORIDE 2 MILLIGRAM(S): 2 INJECTION INTRAMUSCULAR; INTRAVENOUS; SUBCUTANEOUS at 12:00

## 2022-03-16 RX ADMIN — HYDROMORPHONE HYDROCHLORIDE 2 MILLIGRAM(S): 2 INJECTION INTRAMUSCULAR; INTRAVENOUS; SUBCUTANEOUS at 04:15

## 2022-03-16 RX ADMIN — HYDROMORPHONE HYDROCHLORIDE 2 MILLIGRAM(S): 2 INJECTION INTRAMUSCULAR; INTRAVENOUS; SUBCUTANEOUS at 11:23

## 2022-03-16 NOTE — PROGRESS NOTE ADULT - SUBJECTIVE AND OBJECTIVE BOX
Patient is a 67y old  Female who presents with a chief complaint of     SUBJECTIVE / OVERNIGHT EVENTS: no events over night       T(C): 36.3 (03-16-22 @ 17:59), Max: 36.3 (03-16-22 @ 17:59)  HR: 110 (03-16-22 @ 17:59) (110 - 110)  BP: 95/62 (03-16-22 @ 17:59) (95/62 - 95/62)  RR: 17 (03-16-22 @ 17:59) (17 - 17)  SpO2: 95% (03-16-22 @ 17:59) (95% - 95%)    MEDICATIONS  (STANDING):  pantoprazole    Tablet 40 milliGRAM(s) Oral daily    MEDICATIONS  (PRN):  ALBUTerol    90 MICROgram(s) HFA Inhaler 2 Puff(s) Inhalation every 6 hours PRN Shortness of Breath and/or Wheezing  HYDROmorphone   Tablet 2 milliGRAM(s) Oral every 6 hours PRN Severe Pain (7 - 10)  HYDROmorphone   Tablet 1 milliGRAM(s) Oral every 6 hours PRN Moderate Pain (4 - 6)      NECK: Supple, No JVD  CHEST/LUNG: Clear to auscultation bilaterally; No wheeze  HEART: Regular rate and rhythm; No murmurs, rubs, or gallops  ABDOMEN: Soft, Nontender, Nondistended; Bowel sounds present  EXTREMITIES:  2+ Peripheral Pulses, No clubbing, cyanosis, or edema  PSYCH: AAOx3  NEUROLOGY: non-focal  SKIN: No rashes or lesions                            CAPILLARY BLOOD GLUCOSE          RADIOLOGY & ADDITIONAL TESTS:    Imaging Personally Reviewed:    Consultant(s) Notes Reviewed:      Care Discussed with Consultants/Other Providers:

## 2022-03-16 NOTE — PROGRESS NOTE ADULT - ASSESSMENT
66 y/o female without reported PMHx BIBA from [facility] due to EMS. As per EMS, patient found on the floor. pt reports she has been having diarrhea, vomiting, and abdominal pain for months. pt reports she fell due to weakness. Reports she came from [place] and needs placement in to nursing home. pt reports she has not seen a doctor in 2 years and not currently taking meds    ex smoker  emphysema  Mets Ca  Liver mets likely  Ascites - Pleural Eff  Breast Ca -   Pulm Nodules  AAA  KEATON      palliative - heme onc - psych noted  ivf for keaton  palliative measures  opioids prn  oral and skin care  placement and DNR status  assist with needs  mets ca based on imaging - exam and labs

## 2022-03-16 NOTE — PHYSICAL THERAPY INITIAL EVALUATION ADULT - PERTINENT HX OF CURRENT PROBLEM, REHAB EVAL
Pt admitted from [facility] s/p fall and diarrhea.  Pt. came from [place] and needs NH placement.  Pt recently diagnosed with breast Ca.  Head CT->negative; right knee x-ray->minimal suprapatellar effusion

## 2022-03-16 NOTE — PROGRESS NOTE ADULT - SUBJECTIVE AND OBJECTIVE BOX
Date/Time Patient Seen:  		  Referring MD:   Data Reviewed	       Patient is a 67y old  Female who presents with a chief complaint of     Subjective/HPI     PAST MEDICAL & SURGICAL HISTORY:  Breast cancer          Medication list         MEDICATIONS  (STANDING):  pantoprazole  Injectable 40 milliGRAM(s) IV Push daily  sodium chloride 0.9%. 1000 milliLiter(s) (50 mL/Hr) IV Continuous <Continuous>    MEDICATIONS  (PRN):  ALBUTerol    90 MICROgram(s) HFA Inhaler 2 Puff(s) Inhalation every 6 hours PRN Shortness of Breath and/or Wheezing  HYDROmorphone   Tablet 2 milliGRAM(s) Oral every 6 hours PRN Severe Pain (7 - 10)  HYDROmorphone   Tablet 1 milliGRAM(s) Oral every 6 hours PRN Moderate Pain (4 - 6)         Vitals log        ICU Vital Signs Last 24 Hrs  T(C): 36.3 (16 Mar 2022 05:39), Max: 36.5 (15 Mar 2022 09:30)  T(F): 97.3 (16 Mar 2022 05:39), Max: 97.7 (15 Mar 2022 09:30)  HR: 98 (16 Mar 2022 05:39) (91 - 103)  BP: 101/52 (16 Mar 2022 05:39) (97/56 - 109/74)  BP(mean): --  ABP: --  ABP(mean): --  RR: 18 (16 Mar 2022 05:39) (17 - 19)  SpO2: 94% (16 Mar 2022 05:39) (94% - 96%)           Input and Output:  I&O's Detail    14 Mar 2022 07:01  -  15 Mar 2022 07:00  --------------------------------------------------------  IN:    Oral Fluid: 150 mL    sodium chloride 0.9%: 600 mL  Total IN: 750 mL    OUT:    Blood Loss (mL): 1 mL  Total OUT: 1 mL    Total NET: 749 mL      15 Mar 2022 07:01  -  16 Mar 2022 06:33  --------------------------------------------------------  IN:    Oral Fluid: 350 mL  Total IN: 350 mL    OUT:    Voided (mL): 400 mL  Total OUT: 400 mL    Total NET: -50 mL          Lab Data                  Review of Systems	      Objective     Physical Examination    heart s1s2  lung dec BS  abd soft      Pertinent Lab findings & Imaging      Paulina:  NO   Adequate UO     I&O's Detail    14 Mar 2022 07:01  -  15 Mar 2022 07:00  --------------------------------------------------------  IN:    Oral Fluid: 150 mL    sodium chloride 0.9%: 600 mL  Total IN: 750 mL    OUT:    Blood Loss (mL): 1 mL  Total OUT: 1 mL    Total NET: 749 mL      15 Mar 2022 07:01  -  16 Mar 2022 06:33  --------------------------------------------------------  IN:    Oral Fluid: 350 mL  Total IN: 350 mL    OUT:    Voided (mL): 400 mL  Total OUT: 400 mL    Total NET: -50 mL               Discussed with:     Cultures:	        Radiology

## 2022-03-16 NOTE — PROGRESS NOTE ADULT - ASSESSMENT
66 y/o female without reported PMHx BIBA from [facility] due to EMS. As per EMS, patient found on the floor. pt reports she has been having diarrhea, vomiting, and abdominal pain for months. pt reports she fell due to weakness. Reports she came from [place] and needs placement in to nursing home. pt reports she has not seen a doctor in 2 years and not currently taking meds.     #Stage 4 breast cancer  Patient declined treatment as per family     #Pleural Effusion likely Malignant from metastatic disease   thoracentesis?    #Ascites   Paracentesis     # KEATON on CKD    Renal following   s/p gentle hydration   Monitor renal function     #Schizophrenia  Psych recs noted; pt lack capacity to take medical decisions     #Cancer pain  d/c morphine due to KEATON  continue dilaudid prn    #Goals of care/advance care planning  - Palliative performance scale score: 40% (poor)  - Prognosis: days-weeks (pt is hospice eligible)   - Code status: DNR/DNI, no feeding (MOLST form filled out and placed in the chart)  - GOC: NH with hospice/comfort care.   - Surrogate: sister Katarina  - Psychosocial support provided    Appreciate consult. Discussed with the primary team.    Kassidy Douglas MD, CM-C

## 2022-03-16 NOTE — PHYSICAL THERAPY INITIAL EVALUATION ADULT - ADDITIONAL COMMENTS
Pt is agitated and unable to answer questions.  Unsure of prior status. Pt is agitated and unable to answer questions.  Unsure of prior status.  As per chart, pt was in a nursing facility in [place].

## 2022-03-16 NOTE — CHART NOTE - NSCHARTNOTEFT_GEN_A_CORE
Assessment:     Pt seen for nutrition follow-up. Pt is a "66 yo F BIBA from [facility], pt reported found on the floor by the EMS. Pt stated that she fell earlier. Pt also stated that she needs antibiotic for her diarrhea. Pt reported that she have diarrhea for months now. Pt also complains of pain right arm and wrist and bilateral legs. Metastatic Breast cancer, Pleural Effusion likely Malignant Effusion from metastatic disease. Patient refused treatment for Breast ca as per family."    Pt previously seen for nutrition assessment secondary to metastatic breast CA. As per social work note, pt has a h/o schizophrenia and has a h/o breast cancer with mets, although pt denies. Visited pt this afternoon, pt asleep in bed during time of visit. Observed untouched lunch tray at bedside. Per flowsheet review, pt did not feel well enough to eat breakfast and lunch meal today. PO intakes ranging from 0-50% per RN documentation. Pt is blind, needs assistance with feeding. During initial assessment, pt previously reported no difficulty chewing/swallowing, although pt has no teeth and does not wear dentures. Providing soft foods (diet office aware). Recommend SLP evaluation to assess pt's swallow function. No food allergies. Denies N/V. Reports diarrhea PTA. + BM 3/15. CBW on admission 140#. Pt unaware of UBW. 1+ BL foot edema noted. Skin: ecchymosis. At baseline, pt reports good appetite, typically consumes 3 meals/day (likes tomato juice, applesauce, ice cream- pt unable to provide addtl meal preferences at this time). Discussed nutritional supplements, pt declined reports she has tried in the past and does not like ensure or boost. Providing magic cup fortified ice cream TID for addtl kcal/protein (290kcal, 9g protein per 4 oz cup). Pt presently on DASH/TLC diet. Receiving IVFs; NaCl @ 50ml/hr. Recommend assistance and encouragement at meal times. Psych and palliative care following. Per [facility] conversation on 3/14, "Sister consented to DNR/DNI and no feeding tube as well as d/c to NH with hospice/comfort care." RD to follow-up and will continue to monitor pt's nutritional status.    Factors impacting intake: [ ] none [ ] nausea  [ ] vomiting [ ] diarrhea [ ] constipation  [ ]chewing problems [ ] swallowing issues  [X] other: does not feel well enough to eat per nursing    Diet Prescription: Diet, DASH/TLC:   Sodium & Cholesterol Restricted (03-11-22 @ 15:16)    Intake: poor po intake    Current Weight: Weight (kg): 63.5 (03-11 @ 19:23)  % Weight Change  3/16 150.1#    Pertinent Medications: MEDICATIONS  (STANDING):    MEDICATIONS  (PRN):  ALBUTerol    90 MICROgram(s) HFA Inhaler 2 Puff(s) Inhalation every 6 hours PRN Shortness of Breath and/or Wheezing  HYDROmorphone   Tablet 2 milliGRAM(s) Oral every 6 hours PRN Severe Pain (7 - 10)  HYDROmorphone   Tablet 1 milliGRAM(s) Oral every 6 hours PRN Moderate Pain (4 - 6)    Pertinent Labs:  03-11 Alb 1.9 g/dL<L>    Skin: intact    Estimated Needs:   [X] no change since previous assessment  [ ] recalculated:     Previous Nutrition Diagnosis:   [ ] Inadequate Energy Intake [X] Inadequate Oral Intake [ ] Excessive Energy Intake   [ ] Underweight [ ] Increased Nutrient Needs [ ] Overweight/Obesity   [ ] Altered GI Function [ ] Unintended Weight Loss [ ] Food & Nutrition Related Knowledge Deficit [ ] Malnutrition     Nutrition Diagnosis is [X] ongoing  [ ] resolved [ ] not applicable     New Nutrition Diagnosis: [ ] not applicable       Interventions: Continue nutrition care plan, DASH/TLC diet, honor food preferences, assistance and encouragement at meal times, magic cup fortified ice cream TID, aspiration precautions  Recommend  [ ] Change Diet To:  [ ] Nutrition Supplement  [ ] Nutrition Support  [ ] Other:     Monitoring and Evaluation:   [X] PO intake [ x ] Tolerance to diet prescription [ x ] weights [ x ] labs[ x ] follow up per protocol  [ ] other:

## 2022-03-16 NOTE — PROGRESS NOTE ADULT - SUBJECTIVE AND OBJECTIVE BOX
SUBJECTIVE AND OBJECTIVE:  INTERVAL HPI/OVERNIGHT EVENTS:    DNR on chart:   Allergies    No Known Allergies    Intolerances    MEDICATIONS  (STANDING):  pantoprazole  Injectable 40 milliGRAM(s) IV Push daily  sodium chloride 0.9%. 1000 milliLiter(s) (50 mL/Hr) IV Continuous <Continuous>    MEDICATIONS  (PRN):  ALBUTerol    90 MICROgram(s) HFA Inhaler 2 Puff(s) Inhalation every 6 hours PRN Shortness of Breath and/or Wheezing  HYDROmorphone   Tablet 2 milliGRAM(s) Oral every 6 hours PRN Severe Pain (7 - 10)  HYDROmorphone   Tablet 1 milliGRAM(s) Oral every 6 hours PRN Moderate Pain (4 - 6)      ITEMS UNCHECKED ARE NOT PRESENT    PRESENT SYMPTOMS: [ ]Unable to obtain due to poor mentation   Source if other than patient:  [ ]Family   [ ]Team     Pain:  [ ]yes [ ]no  QOL impact -   Location -                    Aggravating factors -  Quality -  Radiation -  Timing-  Severity (0-10 scale):  Minimal acceptable level (0-10 scale):     Dyspnea:                           [ ]Mild [ ]Moderate [ ]Severe  Anxiety:                             [ ]Mild [ ]Moderate [ ]Severe  Fatigue:                             [ ]Mild [ ]Moderate [ ]Severe  Nausea:                             [ ]Mild [ ]Moderate [ ]Severe  Loss of appetite:              [ ]Mild [ ]Moderate [ ]Severe  Constipation:                    [ ]Mild [ ]Moderate [ ]Severe    CPOT:    https://www.[facility].org/getattachment/[email or web address]/Critical-Care-Pain-Observation-Tool-(CPOT)    PAIN AD Score:	  http://geriatrictoolkit.[place].[facility]/cog/painad.pdf (Ctrl + left click to view)    Other Symptoms:  [ ]All other review of systems negative     Palliative Performance Status Version 2:         %      http://npcrc.org/files/news/palliative_performance_scale_ppsv2.pdf  PHYSICAL EXAM:  Vital Signs Last 24 Hrs  T(C): 36.6 (16 Mar 2022 08:56), Max: 36.6 (16 Mar 2022 08:56)  T(F): 97.8 (16 Mar 2022 08:56), Max: 97.8 (16 Mar 2022 08:56)  HR: 104 (16 Mar 2022 08:56) (91 - 104)  BP: 116/67 (16 Mar 2022 08:56) (101/52 - 116/67)  BP(mean): --  RR: 18 (16 Mar 2022 08:56) (17 - 18)  SpO2: 94% (16 Mar 2022 08:56) (94% - 95%) I&O's Summary    15 Mar 2022 07:01  -  16 Mar 2022 07:00  --------------------------------------------------------  IN: 350 mL / OUT: 400 mL / NET: -50 mL       GENERAL:  [ ]Alert  [ ]Oriented x   [ ]Lethargic  [ ]Cachexia  [ ]Unarousable  [ ]Verbal  [ ]Non-Verbal  Behavioral:   [ ]Anxiety  [ ]Delirium [ ]Agitation [ ]Other  HEENT:  [ ]Normal   [ ]Dry mouth   [ ]ET Tube/Trach  [ ]Oral lesions  PULMONARY:   [ ]Clear [ ]Tachypnea  [ ]Audible excessive secretions   [ ]Rhonchi        [ ]Right [ ]Left [ ]Bilateral  [ ]Crackles        [ ]Right [ ]Left [ ]Bilateral  [ ]Wheezing     [ ]Right [ ]Left [ ]Bilateral  [ ]Diminished BS [ ] Right [ ]Left [ ]Bilateral  CARDIOVASCULAR:    [ ]Regular [ ]Irregular [ ]Tachy  [ ]Berlin [ ]Murmur [ ]Other  GASTROINTESTINAL:  [ ]Soft  [ ]Distended   [ ]+BS  [ ]Non tender [ ]Tender  [ ]PEG [ ]OGT/ NGT   Last BM:    GENITOURINARY:  [ ]Normal [ ]Incontinent   [ ]Oliguria/Anuria   [ ]Gallardo  MUSCULOSKELETAL:   [ ]Normal   [ ]Weakness  [ ]Bed/Wheelchair bound [ ]Edema  NEUROLOGIC:   [ ]No focal deficits  [ ] Cognitive impairment  [ ] Dysphagia [ ]Dysarthria [ ] Paresis [ ]Other   SKIN:   [ ]Normal  [ ]Rash   [ ]Pressure ulcer(s) [ ]y [ ]n present on admission    CRITICAL CARE:  [ ]Shock Present  [ ]Septic [ ]Cardiogenic [ ]Neurologic [ ]Hypovolemic  [ ]Vasopressors [ ]Inotropes  [ ]Respiratory failure present [ ]Mechanical Ventilation [ ]Non-invasive ventilatory support [ ]High-Flow   [ ]Acute  [ ]Chronic [ ]Hypoxic  [ ]Hypercarbic [ ]Other  [ ]Other organ failure     LABS:            RADIOLOGY & ADDITIONAL STUDIES:    Protein Calorie Malnutrition Present: [ ]mild [ ]moderate [ ]severe [ ]underweight [ ]morbid obesity  https://www.andeal.org/vault/2440/web/files/ONC/Table_Clinical%20Characteristics%20to%20Document%20Malnutrition-White%20JV%20et%20al%202012.pdf    Height (cm): 157.5 (03-11-22 @ 19:23)  Weight (kg): 63.5 (03-11-22 @ 19:23)  BMI (kg/m2): 25.6 (03-11-22 @ 19:23)    [ ]PPSV2 < or = 30%  [ ]significant weight loss [ ]poor nutritional intake [ ]anasarca    [ ]Artificial Nutrition    REFERRALS:   [ ]Chaplaincy  [ ]Hospice  [ ]Child Life  [ ]Social Work  [ ]Case management [ ]Holistic Therapy     Goals of Care Document:     SUBJECTIVE AND OBJECTIVE/INTERVAL HPI/OVERNIGHT EVENTS:  - no acute events overnight  - no signs of distress    DNR on chart:   Allergies    No Known Allergies    Intolerances    MEDICATIONS  (STANDING):  pantoprazole  Injectable 40 milliGRAM(s) IV Push daily  sodium chloride 0.9%. 1000 milliLiter(s) (50 mL/Hr) IV Continuous <Continuous>    MEDICATIONS  (PRN):  ALBUTerol    90 MICROgram(s) HFA Inhaler 2 Puff(s) Inhalation every 6 hours PRN Shortness of Breath and/or Wheezing  HYDROmorphone   Tablet 2 milliGRAM(s) Oral every 6 hours PRN Severe Pain (7 - 10)  HYDROmorphone   Tablet 1 milliGRAM(s) Oral every 6 hours PRN Moderate Pain (4 - 6)      ITEMS UNCHECKED ARE NOT PRESENT    PRESENT SYMPTOMS: [ ]Unable to obtain due to poor mentation   Source if other than patient:  [ ]Family   [ ]Team     Pain:  [ ]yes [x ]no  QOL impact -   Location -                    Aggravating factors -  Quality -  Radiation -  Timing-  Severity (0-10 scale):  Minimal acceptable level (0-10 scale):     Dyspnea:                           [ ]Mild [ ]Moderate [ ]Severe  Anxiety:                             [ ]Mild [ ]Moderate [ ]Severe  Fatigue:                             [ ]Mild [ ]Moderate [ ]Severe  Nausea:                             [ ]Mild [ ]Moderate [ ]Severe  Loss of appetite:              [ ]Mild [ ]Moderate [ ]Severe  Constipation:                    [ ]Mild [ ]Moderate [ ]Severe    CPOT:    https://www.[facility].org/getattachment/[email or web address]/Critical-Care-Pain-Observation-Tool-(CPOT)    PAIN AD Score:	  http://geriatrictoolkit.[facility]/cog/painad.pdf (Ctrl + left click to view)    Other Symptoms:  [x ]All other review of systems negative     Palliative Performance Status Version 2:         %      http://npcrc.org/files/news/palliative_performance_scale_ppsv2.pdf  PHYSICAL EXAM:  Vital Signs Last 24 Hrs  T(C): 36.6 (16 Mar 2022 08:56), Max: 36.6 (16 Mar 2022 08:56)  T(F): 97.8 (16 Mar 2022 08:56), Max: 97.8 (16 Mar 2022 08:56)  HR: 104 (16 Mar 2022 08:56) (91 - 104)  BP: 116/67 (16 Mar 2022 08:56) (101/52 - 116/67)  BP(mean): --  RR: 18 (16 Mar 2022 08:56) (17 - 18)  SpO2: 94% (16 Mar 2022 08:56) (94% - 95%) I&O's Summary    15 Mar 2022 07:01  -  16 Mar 2022 07:00  --------------------------------------------------------  IN: 350 mL / OUT: 400 mL / NET: -50 mL    HEENT: NCAT, anicteric, MMM, EOMI, PERRL  NECK: Supple, No JVD  CHEST/LUNG: Clear to auscultation bilaterally; No wheeze  HEART: Regular rate and rhythm; No murmurs, rubs, or gallops  ABDOMEN: Soft, Nontender, Nondistended; Bowel sounds present  EXTREMITIES:  2+ Peripheral Pulses, No clubbing, cyanosis, or edema  PSYCH: AAOx3  NEUROLOGY: non-focal  SKIN: No rashes or lesions      LABS:  reviewed      RADIOLOGY & ADDITIONAL STUDIES: reviewed    Protein Calorie Malnutrition Present: [ ]mild [ ]moderate [ ]severe [ ]underweight [ ]morbid obesity  https://www.andeal.org/vault/2440/web/files/ONC/Table_Clinical%20Characteristics%20to%20Document%20Malnutrition-White%20JV%20et%20al%202012.pdf    Height (cm): 157.5 (03-11-22 @ 19:23)  Weight (kg): 63.5 (03-11-22 @ 19:23)  BMI (kg/m2): 25.6 (03-11-22 @ 19:23)    [ ]PPSV2 < or = 30%  [ ]significant weight loss [ ]poor nutritional intake [ ]anasarca    [ ]Artificial Nutrition    REFERRALS:   [ ]Chaplaincy  [ ]Hospice  [ ]Child Life  [ ]Social Work  [ ]Case management [ ]Holistic Therapy     Goals of Care Document:

## 2022-03-17 RX ADMIN — HYDROMORPHONE HYDROCHLORIDE 2 MILLIGRAM(S): 2 INJECTION INTRAMUSCULAR; INTRAVENOUS; SUBCUTANEOUS at 21:00

## 2022-03-17 RX ADMIN — HYDROMORPHONE HYDROCHLORIDE 2 MILLIGRAM(S): 2 INJECTION INTRAMUSCULAR; INTRAVENOUS; SUBCUTANEOUS at 01:04

## 2022-03-17 RX ADMIN — HYDROMORPHONE HYDROCHLORIDE 2 MILLIGRAM(S): 2 INJECTION INTRAMUSCULAR; INTRAVENOUS; SUBCUTANEOUS at 21:30

## 2022-03-17 NOTE — PROGRESS NOTE ADULT - SUBJECTIVE AND OBJECTIVE BOX
Date/Time Patient Seen:  		  Referring MD:   Data Reviewed	       Patient is a 67y old  Female who presents with a chief complaint of     Subjective/HPI     PAST MEDICAL & SURGICAL HISTORY:  Breast cancer          Medication list         MEDICATIONS  (STANDING):  pantoprazole    Tablet 40 milliGRAM(s) Oral daily    MEDICATIONS  (PRN):  ALBUTerol    90 MICROgram(s) HFA Inhaler 2 Puff(s) Inhalation every 6 hours PRN Shortness of Breath and/or Wheezing  HYDROmorphone   Tablet 2 milliGRAM(s) Oral every 6 hours PRN Severe Pain (7 - 10)  HYDROmorphone   Tablet 1 milliGRAM(s) Oral every 6 hours PRN Moderate Pain (4 - 6)         Vitals log        ICU Vital Signs Last 24 Hrs  T(C): 36.3 (17 Mar 2022 05:00), Max: 36.6 (16 Mar 2022 08:56)  T(F): 97.4 (17 Mar 2022 05:00), Max: 97.8 (16 Mar 2022 08:56)  HR: 97 (17 Mar 2022 05:00) (97 - 110)  BP: 98/62 (17 Mar 2022 05:00) (95/62 - 116/67)  BP(mean): --  ABP: --  ABP(mean): --  RR: 18 (17 Mar 2022 05:00) (17 - 18)  SpO2: 96% (17 Mar 2022 05:00) (93% - 96%)           Input and Output:  I&O's Detail    15 Mar 2022 07:01  -  16 Mar 2022 07:00  --------------------------------------------------------  IN:    Oral Fluid: 350 mL  Total IN: 350 mL    OUT:    Voided (mL): 400 mL  Total OUT: 400 mL    Total NET: -50 mL      16 Mar 2022 07:01  -  17 Mar 2022 06:24  --------------------------------------------------------  IN:    Oral Fluid: 150 mL  Total IN: 150 mL    OUT:  Total OUT: 0 mL    Total NET: 150 mL          Lab Data                  Review of Systems	      Objective     Physical Examination    heart s1s2  lung dec BS  abd soft      Pertinent Lab findings & Imaging      Paulina:  NO   Adequate UO     I&O's Detail    15 Mar 2022 07:01  -  16 Mar 2022 07:00  --------------------------------------------------------  IN:    Oral Fluid: 350 mL  Total IN: 350 mL    OUT:    Voided (mL): 400 mL  Total OUT: 400 mL    Total NET: -50 mL      16 Mar 2022 07:01  -  17 Mar 2022 06:24  --------------------------------------------------------  IN:    Oral Fluid: 150 mL  Total IN: 150 mL    OUT:  Total OUT: 0 mL    Total NET: 150 mL               Discussed with:     Cultures:	        Radiology

## 2022-03-17 NOTE — PROGRESS NOTE ADULT - ASSESSMENT
68 y/o female without reported PMHx BIBA from [facility] due to EMS. As per EMS, patient found on the floor. pt reports she has been having diarrhea, vomiting, and abdominal pain for months. pt reports she fell due to weakness. Reports she came from [place] and needs placement in to nursing home. pt reports she has not seen a doctor in 2 years and not currently taking meds    ex smoker  emphysema  Mets Ca  Liver mets likely  Ascites - Pleural Eff  Breast Ca -   Pulm Nodules  AAA  KEATON      palliative - heme onc - psych noted  palliative measures  opioids prn  oral and skin care  placement and DNR status  assist with needs  mets ca based on imaging - exam and labs

## 2022-03-18 ENCOUNTER — TRANSCRIPTION ENCOUNTER (OUTPATIENT)
Age: 67
End: 2022-03-18

## 2022-03-18 VITALS
DIASTOLIC BLOOD PRESSURE: 74 MMHG | OXYGEN SATURATION: 93 % | SYSTOLIC BLOOD PRESSURE: 104 MMHG | RESPIRATION RATE: 16 BRPM | HEART RATE: 107 BPM | TEMPERATURE: 98 F

## 2022-03-18 LAB — SARS-COV-2 RNA SPEC QL NAA+PROBE: SIGNIFICANT CHANGE UP

## 2022-03-18 PROCEDURE — 83880 ASSAY OF NATRIURETIC PEPTIDE: CPT

## 2022-03-18 PROCEDURE — 74176 CT ABD & PELVIS W/O CONTRAST: CPT | Mod: MA

## 2022-03-18 PROCEDURE — 83690 ASSAY OF LIPASE: CPT

## 2022-03-18 PROCEDURE — 73562 X-RAY EXAM OF KNEE 3: CPT

## 2022-03-18 PROCEDURE — 83615 LACTATE (LD) (LDH) ENZYME: CPT

## 2022-03-18 PROCEDURE — 80053 COMPREHEN METABOLIC PANEL: CPT

## 2022-03-18 PROCEDURE — U0003: CPT

## 2022-03-18 PROCEDURE — 85610 PROTHROMBIN TIME: CPT

## 2022-03-18 PROCEDURE — 96365 THER/PROPH/DIAG IV INF INIT: CPT

## 2022-03-18 PROCEDURE — 36415 COLL VENOUS BLD VENIPUNCTURE: CPT

## 2022-03-18 PROCEDURE — 76775 US EXAM ABDO BACK WALL LIM: CPT

## 2022-03-18 PROCEDURE — 0225U NFCT DS DNA&RNA 21 SARSCOV2: CPT

## 2022-03-18 PROCEDURE — 87507 IADNA-DNA/RNA PROBE TQ 12-25: CPT

## 2022-03-18 PROCEDURE — 83605 ASSAY OF LACTIC ACID: CPT

## 2022-03-18 PROCEDURE — 82550 ASSAY OF CK (CPK): CPT

## 2022-03-18 PROCEDURE — 72125 CT NECK SPINE W/O DYE: CPT | Mod: MA

## 2022-03-18 PROCEDURE — U0005: CPT

## 2022-03-18 PROCEDURE — 85730 THROMBOPLASTIN TIME PARTIAL: CPT

## 2022-03-18 PROCEDURE — 84484 ASSAY OF TROPONIN QUANT: CPT

## 2022-03-18 PROCEDURE — 71045 X-RAY EXAM CHEST 1 VIEW: CPT

## 2022-03-18 PROCEDURE — 70450 CT HEAD/BRAIN W/O DYE: CPT | Mod: MA

## 2022-03-18 PROCEDURE — 82378 CARCINOEMBRYONIC ANTIGEN: CPT

## 2022-03-18 PROCEDURE — 97161 PT EVAL LOW COMPLEX 20 MIN: CPT

## 2022-03-18 PROCEDURE — 86803 HEPATITIS C AB TEST: CPT

## 2022-03-18 PROCEDURE — 84443 ASSAY THYROID STIM HORMONE: CPT

## 2022-03-18 PROCEDURE — 93005 ELECTROCARDIOGRAM TRACING: CPT

## 2022-03-18 PROCEDURE — 99285 EMERGENCY DEPT VISIT HI MDM: CPT

## 2022-03-18 PROCEDURE — 71250 CT THORAX DX C-: CPT | Mod: MA

## 2022-03-18 PROCEDURE — 80048 BASIC METABOLIC PNL TOTAL CA: CPT

## 2022-03-18 PROCEDURE — 85025 COMPLETE CBC W/AUTO DIFF WBC: CPT

## 2022-03-18 RX ORDER — HYDROMORPHONE HYDROCHLORIDE 2 MG/ML
1 INJECTION INTRAMUSCULAR; INTRAVENOUS; SUBCUTANEOUS
Qty: 0 | Refills: 0 | DISCHARGE
Start: 2022-03-18

## 2022-03-18 RX ORDER — PANTOPRAZOLE SODIUM 20 MG/1
1 TABLET, DELAYED RELEASE ORAL
Qty: 0 | Refills: 0 | DISCHARGE
Start: 2022-03-18

## 2022-03-18 NOTE — PROGRESS NOTE ADULT - ASSESSMENT
66 y/o female without reported PMHx BIBA from [facility] due to EMS. As per EMS, patient found on the floor. pt reports she has been having diarrhea, vomiting, and abdominal pain for months. pt reports she fell due to weakness. Reports she came from [place] and needs placement in to nursing home. pt reports she has not seen a doctor in 2 years and not currently taking meds    ex smoker  emphysema  Mets Ca  Liver mets likely  Ascites - Pleural Eff  Breast Ca -   Pulm Nodules  AAA  KEATON      palliative - heme onc - psych noted  palliative measures  opioids prn  oral and skin care  placement and DNR status  assist with needs  mets ca based on imaging - exam and labs

## 2022-03-18 NOTE — DISCHARGE NOTE NURSING/CASE MANAGEMENT/SOCIAL WORK - PATIENT PORTAL LINK FT
You can access the FollowMyHealth Patient Portal offered by [facility] by registering at the following website: [email or web address]. By joining [employer]’s FollowMyHealth portal, you will also be able to view your health information using other applications (apps) compatible with our system.

## 2022-03-18 NOTE — DISCHARGE NOTE NURSING/CASE MANAGEMENT/SOCIAL WORK - 
ADDITIONAL INFORMATION
Per Florencia of [facility] [place], [place], @ [phone], Ms. Shore was vaccinated w/ Pfizer on 02/07 and 02/21/2021.

## 2022-03-18 NOTE — DISCHARGE NOTE PROVIDER - HOSPITAL COURSE
## Metastatic Breast cancer  Pleural Effusion likely Malignant Effusion from metastatic disease   Ascites Paracentesis     Patient refused treatment for Breast ca as per family   Will get Palliative care consult for GOC   Pulm consult ??Thoracentesis   Nebs     # KEATON on CKD Bladder sono   Renal following   Gentle hydration   Monitor renal function     Psych consult to assess capacity to take medical decisions -Patient does not have capacity to  take decision  Hem Onc eval appreciated given advance stage of malignancy poor performance status patient not a candidate for chemotherapy. Family agrees with palliative care consult.   - progn Metastatic Breast cancer  Pleural Effusion likely Malignant Effusion from metastatic disease   Ascites Paracentesis     Patient refused treatment for Breast ca as per family   Will get Palliative care consult for Loma Linda University Medical Center   Pulm consult ??Thoracentesis   Nebs     # KEATON on CKD Bladder sono   Renal following   Gentle hydration   Monitor renal function     Psych consult to assess capacity to take medical decisions -Patient does not have capacity to  take decision  Hem Onc eval appreciated given advance stage of malignancy poor performance status patient not a candidate for chemotherapy. Family agrees with palliative care consult.   - progn

## 2022-03-18 NOTE — DISCHARGE NOTE PROVIDER - NSDCMRMEDTOKEN_GEN_ALL_CORE_FT
HYDROmorphone 2 mg oral tablet: 1 tab(s) orally every 6 hours, As needed, Severe Pain (7 - 10)  pantoprazole 40 mg oral delayed release tablet: 1 tab(s) orally once a day

## 2022-03-18 NOTE — PROGRESS NOTE ADULT - SUBJECTIVE AND OBJECTIVE BOX
Date/Time Patient Seen:  		  Referring MD:   Data Reviewed	       Patient is a 67y old  Female who presents with a chief complaint of     Subjective/HPI     PAST MEDICAL & SURGICAL HISTORY:  Breast cancer          Medication list         MEDICATIONS  (STANDING):  pantoprazole    Tablet 40 milliGRAM(s) Oral daily    MEDICATIONS  (PRN):  ALBUTerol    90 MICROgram(s) HFA Inhaler 2 Puff(s) Inhalation every 6 hours PRN Shortness of Breath and/or Wheezing  HYDROmorphone   Tablet 2 milliGRAM(s) Oral every 6 hours PRN Severe Pain (7 - 10)  HYDROmorphone   Tablet 1 milliGRAM(s) Oral every 6 hours PRN Moderate Pain (4 - 6)         Vitals log        ICU Vital Signs Last 24 Hrs  T(C): 36.4 (18 Mar 2022 05:25), Max: 36.6 (17 Mar 2022 18:00)  T(F): 97.6 (18 Mar 2022 05:25), Max: 97.8 (17 Mar 2022 18:00)  HR: 110 (18 Mar 2022 05:25) (109 - 110)  BP: 96/57 (18 Mar 2022 05:25) (96/57 - 104/68)  BP(mean): --  ABP: --  ABP(mean): --  RR: 18 (18 Mar 2022 05:25) (18 - 22)  SpO2: 94% (18 Mar 2022 05:25) (93% - 94%)           Input and Output:  I&O's Detail    16 Mar 2022 07:01  -  17 Mar 2022 07:00  --------------------------------------------------------  IN:    Oral Fluid: 300 mL  Total IN: 300 mL    OUT:  Total OUT: 0 mL    Total NET: 300 mL          Lab Data                  Review of Systems	      Objective     Physical Examination    heart s1s2  lung dec BS  abd soft      Pertinent Lab findings & Imaging      Paulina:  NO   Adequate UO     I&O's Detail    16 Mar 2022 07:01  -  17 Mar 2022 07:00  --------------------------------------------------------  IN:    Oral Fluid: 300 mL  Total IN: 300 mL    OUT:  Total OUT: 0 mL    Total NET: 300 mL               Discussed with:     Cultures:	        Radiology

## 2022-03-18 NOTE — PROGRESS NOTE ADULT - PROVIDER SPECIALTY LIST ADULT
Hospitalist
Pulmonology
Pulmonology
Hospitalist
Hospitalist
Nephrology
Palliative Care
Pulmonology
Pulmonology
Hospitalist
Pulmonology

## 2022-03-18 NOTE — DISCHARGE NOTE NURSING/CASE MANAGEMENT/SOCIAL WORK - NSDCPEFALRISK_GEN_ALL_CORE
For information on Fall & Injury Prevention, visit: [email or web address] OR  [email or web address] OR  https://www.cdc.gov/steadi/patient.html

## 2022-03-18 NOTE — DISCHARGE NOTE PROVIDER - NSDCCPCAREPLAN_GEN_ALL_CORE_FT
PRINCIPAL DISCHARGE DIAGNOSIS  Diagnosis: Breast CA  Assessment and Plan of Treatment: metastaic beast cancer prro prognosis dnr/dni Pain meds      SECONDARY DISCHARGE DIAGNOSES  Diagnosis: Ascites  Assessment and Plan of Treatment:     Diagnosis: Pleural effusion  Assessment and Plan of Treatment:     Diagnosis: KEATON (acute kidney injury)  Assessment and Plan of Treatment:     Diagnosis: Breast CA  Assessment and Plan of Treatment:

## 2022-03-18 NOTE — DISCHARGE NOTE PROVIDER - CARE PROVIDER_API CALL
Oscar Gay)  Critical Care Medicine; HospicePalliative Medicine; Internal Medicine; Pulmonary Disease  [address] [place] [address]  Phone: [phone]  Fax: [phone]  Follow Up Time:

## 2024-04-16 NOTE — PATIENT PROFILE ADULT - NSPRONUTRITIONRISK_GEN_A_NUR
----- Message from Tamiko Bernstein sent at 4/16/2024 11:07 AM CDT -----  Regarding: Refill  Contact: pt [phone]  Rx Refill/Request     Is this a Refill or New Rx:  Refill     Rx Name and Strength:  oxyCODONE-acetaminophen (PERCOCET)  mg per tablet     Preferred Pharmacy with phone number:    [id number]  [place] [address] AT [address]  [place] [address]  Phone: [phone] Fax: [phone]      Communication Preference:      Additional Information:  
Last ordered:03/11/24    Last seen:10/19/23  Upcoming appt:05/20/24    
No indicators present